# Patient Record
Sex: MALE | Race: WHITE | NOT HISPANIC OR LATINO | Employment: FULL TIME | URBAN - METROPOLITAN AREA
[De-identification: names, ages, dates, MRNs, and addresses within clinical notes are randomized per-mention and may not be internally consistent; named-entity substitution may affect disease eponyms.]

---

## 2018-08-24 ENCOUNTER — HOSPITAL ENCOUNTER (INPATIENT)
Facility: HOSPITAL | Age: 58
LOS: 2 days | Discharge: HOME | DRG: 390 | End: 2018-08-26
Attending: EMERGENCY MEDICINE | Admitting: SURGERY
Payer: COMMERCIAL

## 2018-08-24 ENCOUNTER — APPOINTMENT (EMERGENCY)
Dept: CT IMAGING | Facility: HOSPITAL | Age: 58
DRG: 390 | End: 2018-08-24
Payer: COMMERCIAL

## 2018-08-24 DIAGNOSIS — K56.609 SMALL BOWEL OBSTRUCTION (HCC): Primary | ICD-10-CM

## 2018-08-24 LAB
ALBUMIN SERPL BCP-MCNC: 2.7 G/DL (ref 3.5–5)
ALP SERPL-CCNC: 98 U/L (ref 46–116)
ALT SERPL W P-5'-P-CCNC: 40 U/L (ref 12–78)
ANION GAP SERPL CALCULATED.3IONS-SCNC: 7 MMOL/L (ref 4–13)
AST SERPL W P-5'-P-CCNC: 22 U/L (ref 5–45)
BASOPHILS # BLD AUTO: 0.02 THOUSANDS/ΜL (ref 0–0.1)
BASOPHILS NFR BLD AUTO: 0 % (ref 0–1)
BILIRUB SERPL-MCNC: 0.7 MG/DL (ref 0.2–1)
BUN SERPL-MCNC: 13 MG/DL (ref 5–25)
CALCIUM SERPL-MCNC: 10 MG/DL (ref 8.3–10.1)
CHLORIDE SERPL-SCNC: 100 MMOL/L (ref 100–108)
CO2 SERPL-SCNC: 28 MMOL/L (ref 21–32)
CREAT SERPL-MCNC: 1.03 MG/DL (ref 0.6–1.3)
EOSINOPHIL # BLD AUTO: 0.04 THOUSAND/ΜL (ref 0–0.61)
EOSINOPHIL NFR BLD AUTO: 1 % (ref 0–6)
ERYTHROCYTE [DISTWIDTH] IN BLOOD BY AUTOMATED COUNT: 16 % (ref 11.6–15.1)
GFR SERPL CREATININE-BSD FRML MDRD: 81 ML/MIN/1.73SQ M
GLUCOSE SERPL-MCNC: 120 MG/DL (ref 65–140)
HCT VFR BLD AUTO: 48.4 % (ref 36.5–49.3)
HGB BLD-MCNC: 15.7 G/DL (ref 12–17)
IMM GRANULOCYTES # BLD AUTO: 0.02 THOUSAND/UL (ref 0–0.2)
IMM GRANULOCYTES NFR BLD AUTO: 0 % (ref 0–2)
LIPASE SERPL-CCNC: 190 U/L (ref 73–393)
LYMPHOCYTES # BLD AUTO: 1.07 THOUSANDS/ΜL (ref 0.6–4.47)
LYMPHOCYTES NFR BLD AUTO: 13 % (ref 14–44)
MCH RBC QN AUTO: 26.3 PG (ref 26.8–34.3)
MCHC RBC AUTO-ENTMCNC: 32.4 G/DL (ref 31.4–37.4)
MCV RBC AUTO: 81 FL (ref 82–98)
MONOCYTES # BLD AUTO: 0.35 THOUSAND/ΜL (ref 0.17–1.22)
MONOCYTES NFR BLD AUTO: 4 % (ref 4–12)
NEUTROPHILS # BLD AUTO: 6.56 THOUSANDS/ΜL (ref 1.85–7.62)
NEUTS SEG NFR BLD AUTO: 82 % (ref 43–75)
NRBC BLD AUTO-RTO: 0 /100 WBCS
PLATELET # BLD AUTO: 316 THOUSANDS/UL (ref 149–390)
PMV BLD AUTO: 9.6 FL (ref 8.9–12.7)
POTASSIUM SERPL-SCNC: 4.2 MMOL/L (ref 3.5–5.3)
PROT SERPL-MCNC: 8.6 G/DL (ref 6.4–8.2)
RBC # BLD AUTO: 5.98 MILLION/UL (ref 3.88–5.62)
SODIUM SERPL-SCNC: 135 MMOL/L (ref 136–145)
TROPONIN I SERPL-MCNC: <0.02 NG/ML
WBC # BLD AUTO: 8.06 THOUSAND/UL (ref 4.31–10.16)

## 2018-08-24 PROCEDURE — 99285 EMERGENCY DEPT VISIT HI MDM: CPT

## 2018-08-24 PROCEDURE — 96375 TX/PRO/DX INJ NEW DRUG ADDON: CPT

## 2018-08-24 PROCEDURE — 84484 ASSAY OF TROPONIN QUANT: CPT | Performed by: EMERGENCY MEDICINE

## 2018-08-24 PROCEDURE — 93005 ELECTROCARDIOGRAM TRACING: CPT

## 2018-08-24 PROCEDURE — 96361 HYDRATE IV INFUSION ADD-ON: CPT

## 2018-08-24 PROCEDURE — 80053 COMPREHEN METABOLIC PANEL: CPT | Performed by: EMERGENCY MEDICINE

## 2018-08-24 PROCEDURE — 74177 CT ABD & PELVIS W/CONTRAST: CPT

## 2018-08-24 PROCEDURE — 36415 COLL VENOUS BLD VENIPUNCTURE: CPT | Performed by: EMERGENCY MEDICINE

## 2018-08-24 PROCEDURE — 85025 COMPLETE CBC W/AUTO DIFF WBC: CPT | Performed by: EMERGENCY MEDICINE

## 2018-08-24 PROCEDURE — 96374 THER/PROPH/DIAG INJ IV PUSH: CPT

## 2018-08-24 PROCEDURE — 83690 ASSAY OF LIPASE: CPT | Performed by: EMERGENCY MEDICINE

## 2018-08-24 PROCEDURE — 96376 TX/PRO/DX INJ SAME DRUG ADON: CPT

## 2018-08-24 RX ORDER — ONDANSETRON 2 MG/ML
4 INJECTION INTRAMUSCULAR; INTRAVENOUS ONCE
Status: COMPLETED | OUTPATIENT
Start: 2018-08-24 | End: 2018-08-24

## 2018-08-24 RX ORDER — PANTOPRAZOLE SODIUM 40 MG/1
40 INJECTION, POWDER, FOR SOLUTION INTRAVENOUS
Status: DISCONTINUED | OUTPATIENT
Start: 2018-08-25 | End: 2018-08-26 | Stop reason: HOSPADM

## 2018-08-24 RX ORDER — SODIUM CHLORIDE, SODIUM LACTATE, POTASSIUM CHLORIDE, CALCIUM CHLORIDE 600; 310; 30; 20 MG/100ML; MG/100ML; MG/100ML; MG/100ML
125 INJECTION, SOLUTION INTRAVENOUS CONTINUOUS
Status: DISCONTINUED | OUTPATIENT
Start: 2018-08-24 | End: 2018-08-26 | Stop reason: HOSPADM

## 2018-08-24 RX ORDER — PANTOPRAZOLE SODIUM 20 MG/1
20 TABLET, DELAYED RELEASE ORAL DAILY
COMMUNITY

## 2018-08-24 RX ORDER — LORATADINE 10 MG/1
10 TABLET ORAL DAILY
COMMUNITY

## 2018-08-24 RX ORDER — MIDAZOLAM HYDROCHLORIDE 1 MG/ML
1 INJECTION INTRAMUSCULAR; INTRAVENOUS ONCE
Status: COMPLETED | OUTPATIENT
Start: 2018-08-24 | End: 2018-08-24

## 2018-08-24 RX ADMIN — HYDROMORPHONE HYDROCHLORIDE 1 MG: 1 INJECTION, SOLUTION INTRAMUSCULAR; INTRAVENOUS; SUBCUTANEOUS at 23:06

## 2018-08-24 RX ADMIN — SODIUM CHLORIDE, SODIUM LACTATE, POTASSIUM CHLORIDE, AND CALCIUM CHLORIDE 125 ML/HR: .6; .31; .03; .02 INJECTION, SOLUTION INTRAVENOUS at 22:37

## 2018-08-24 RX ADMIN — SODIUM CHLORIDE 1000 ML: 0.9 INJECTION, SOLUTION INTRAVENOUS at 19:09

## 2018-08-24 RX ADMIN — TOPICAL ANESTHETIC 2 SPRAY: 200 SPRAY DENTAL; PERIODONTAL at 21:23

## 2018-08-24 RX ADMIN — ONDANSETRON 4 MG: 2 INJECTION INTRAMUSCULAR; INTRAVENOUS at 19:08

## 2018-08-24 RX ADMIN — MIDAZOLAM HYDROCHLORIDE 1 MG: 1 INJECTION, SOLUTION INTRAMUSCULAR; INTRAVENOUS at 21:23

## 2018-08-24 RX ADMIN — IOHEXOL 100 ML: 350 INJECTION, SOLUTION INTRAVENOUS at 19:59

## 2018-08-24 RX ADMIN — HYDROMORPHONE HYDROCHLORIDE 1 MG: 1 INJECTION, SOLUTION INTRAMUSCULAR; INTRAVENOUS; SUBCUTANEOUS at 19:08

## 2018-08-24 RX ADMIN — HYDROMORPHONE HYDROCHLORIDE 1 MG: 1 INJECTION, SOLUTION INTRAMUSCULAR; INTRAVENOUS; SUBCUTANEOUS at 19:59

## 2018-08-24 NOTE — ED PROVIDER NOTES
History  Chief Complaint   Patient presents with    Abdominal Pain     Pt  complains of mid upper abdominal pain since this am, also complains of irregular bowel movements and feeling of abdominal fullness  Pt  currently vomiting  History provided by:  Patient   used: No     53-year-old male presented with epigastric pain, abdominal bloating, fullness some nausea and 1 episode of vomiting developing earlier today  No history of similar symptoms  Minimal alcohol use  History of cholecystectomy  No other abdominal surgeries  No history of pancreatitis  No urinary complaints  No fever, chills, diarrhea  He had felt bloated and had given himself an enema which was not successful  Last normal bowel movement was yesterday  On exam, hypertensive  Some mild diffuse abdominal tenderness  Seems uncomfortable overall  Plan labs, EKG, abdominal CT  Differential diagnosis includes pancreatitis, bowel obstruction, viral illness, ACS  Prior to Admission Medications   Prescriptions Last Dose Informant Patient Reported? Taking? Testosterone (ANDROGEL TD)   Yes Yes   Sig: Place on the skin   loratadine (CLARITIN) 10 mg tablet   Yes Yes   Sig: Take 10 mg by mouth daily   pantoprazole (PROTONIX) 20 mg tablet   Yes Yes   Sig: Take 20 mg by mouth daily      Facility-Administered Medications: None       History reviewed  No pertinent past medical history  Past Surgical History:   Procedure Laterality Date    CHOLECYSTECTOMY      MENISCECTOMY Right        History reviewed  No pertinent family history  I have reviewed and agree with the history as documented  Social History   Substance Use Topics    Smoking status: Never Smoker    Smokeless tobacco: Never Used    Alcohol use No        Review of Systems   Constitutional: Negative for activity change, appetite change and fever  Respiratory: Negative for chest tightness and shortness of breath      Cardiovascular: Negative for chest pain and leg swelling  Gastrointestinal: Positive for abdominal distention, abdominal pain, nausea and vomiting  Negative for blood in stool and diarrhea  Musculoskeletal: Negative for back pain, gait problem and neck pain  Skin: Negative for pallor  All other systems reviewed and are negative  Physical Exam  Physical Exam   Constitutional: He is oriented to person, place, and time  He appears well-developed and well-nourished  No distress  HENT:   Head: Normocephalic  Mouth/Throat: Oropharynx is clear and moist    Neck: Normal range of motion  Neck supple  Cardiovascular: Normal rate, regular rhythm, normal heart sounds and intact distal pulses  Pulmonary/Chest: Effort normal and breath sounds normal    Abdominal: Soft  There is no rebound and no guarding  Some epigastric and mild diffuse tenderness  Musculoskeletal: Normal range of motion  He exhibits no edema  Neurological: He is alert and oriented to person, place, and time  Skin: Skin is warm and dry  Nursing note and vitals reviewed        Vital Signs  ED Triage Vitals   Temperature Pulse Respirations Blood Pressure SpO2   08/24/18 1846 08/24/18 1843 08/24/18 1843 08/24/18 1843 08/24/18 1843   97 9 °F (36 6 °C) 100 18 (!) 180/105 98 %      Temp Source Heart Rate Source Patient Position - Orthostatic VS BP Location FiO2 (%)   08/24/18 1846 08/24/18 1843 08/24/18 1843 08/24/18 1843 --   Oral Monitor Sitting Right arm       Pain Score       08/24/18 1846       Worst Possible Pain           Vitals:    08/24/18 1843 08/24/18 1930 08/24/18 2030   BP: (!) 180/105 150/99 137/90   Pulse: 100 92    Patient Position - Orthostatic VS: Sitting Sitting Sitting       Visual Acuity      ED Medications  Medications   benzocaine (HURRICAINE) 20 % mucosal spray 2 spray (not administered)   midazolam (VERSED) injection 1 mg (not administered)   sodium chloride 0 9 % bolus 1,000 mL (1,000 mL Intravenous New Bag 8/24/18 1909)   HYDROmorphone (DILAUDID) injection 1 mg (1 mg Intravenous Given 8/24/18 1908)   ondansetron (ZOFRAN) injection 4 mg (4 mg Intravenous Given 8/24/18 1908)   HYDROmorphone (DILAUDID) injection 1 mg (1 mg Intravenous Given 8/24/18 1959)   iohexol (OMNIPAQUE) 350 MG/ML injection (MULTI-DOSE) 100 mL (100 mL Intravenous Given 8/24/18 1959)       Diagnostic Studies  Results Reviewed     Procedure Component Value Units Date/Time    Troponin I [[id number]]  (Normal) Collected:  08/24/18 1907    Lab Status:  Final result Specimen:  Blood from Arm, Right Updated:  08/24/18 1942     Troponin I <0 02 ng/mL     Comprehensive metabolic panel [[id number]]  (Abnormal) Collected:  08/24/18 1907    Lab Status:  Final result Specimen:  Blood from Arm, Right Updated:  08/24/18 1939     Sodium 135 (L) mmol/L      Potassium 4 2 mmol/L      Chloride 100 mmol/L      CO2 28 mmol/L      Anion Gap 7 mmol/L      BUN 13 mg/dL      Creatinine 1 03 mg/dL      Glucose 120 mg/dL      Calcium 10 0 mg/dL      AST 22 U/L      ALT 40 U/L      Alkaline Phosphatase 98 U/L      Total Protein 8 6 (H) g/dL      Albumin 2 7 (L) g/dL      Total Bilirubin 0 70 mg/dL      eGFR 81 ml/min/1 73sq m     Narrative:         National Kidney Disease Education Program recommendations are as follows:  GFR calculation is accurate only with a steady state creatinine  Chronic Kidney disease less than 60 ml/min/1 73 sq  meters  Kidney failure less than 15 ml/min/1 73 sq  meters      Lipase [[id number]]  (Normal) Collected:  08/24/18 1907    Lab Status:  Final result Specimen:  Blood from Arm, Right Updated:  08/24/18 1939     Lipase 190 u/L     CBC and differential [[id number]]  (Abnormal) Collected:  08/24/18 1907    Lab Status:  Final result Specimen:  Blood from Arm, Right Updated:  08/24/18 1920     WBC 8 06 Thousand/uL      RBC 5 98 (H) Million/uL      Hemoglobin 15 7 g/dL      Hematocrit 48 4 %      MCV 81 (L) fL      MCH 26 3 (L) pg      MCHC 32 4 g/dL      RDW 16 0 (H) %      MPV 9 6 fL Platelets 346 Thousands/uL      nRBC 0 /100 WBCs      Neutrophils Relative 82 (H) %      Immat GRANS % 0 %      Lymphocytes Relative 13 (L) %      Monocytes Relative 4 %      Eosinophils Relative 1 %      Basophils Relative 0 %      Neutrophils Absolute 6 56 Thousands/µL      Immature Grans Absolute 0 02 Thousand/uL      Lymphocytes Absolute 1 07 Thousands/µL      Monocytes Absolute 0 35 Thousand/µL      Eosinophils Absolute 0 04 Thousand/µL      Basophils Absolute 0 02 Thousands/µL                  CT abdomen pelvis with contrast   Final Result by Victor M Sin MD (08/24 2100)   1  Distal small bowel obstruction  Transition point in the mid to distal ileum in the lateral aspect of the right mid abdomen  No evidence of bowel mass, enteritis or colitis  2   Right middle lobe 5 mm pulmonary nodule Based on current Fleischner Society 2017 Guidelines on incidental pulmonary nodule, no routine follow-up is needed if the patient is considered low risk for lung cancer  If the patient is considered high risk    for lung cancer, 12 month follow-up non-contrast chest CT is recommended               I personally discussed this study with Anjelica William on 8/24/2018 at 9:00 PM                   Workstation performed: [id number]                    Procedures  ECG 12 Lead Documentation  Date/Time: 8/24/2018 7:28 PM  Performed by: Janett Aparicio  Authorized by: Janett Aparicio     Indications / Diagnosis:  ACS  ECG reviewed by me, the ED Provider: yes    Patient location:  ED  Previous ECG:     Previous ECG:  Compared to current    Comparison ECG info:  2/19/00    Similarity:  No change  Rate:     ECG rate:  87  Rhythm:     Rhythm: sinus rhythm    Ectopy:     Ectopy: none    QRS:     QRS axis:  Normal  Conduction:     Conduction: abnormal      Abnormal conduction: 1st degree    ST segments:     ST segments:  Normal  T waves:     T waves: normal             Phone Contacts  ED Phone Contact    ED Course MDM  Number of Diagnoses or Management Options  Small bowel obstruction [facility]):   Diagnosis management comments: 82-year-old male presented with 1 day of epigastric pain, abdominal bloating and vomiting  Found to have a small-bowel obstruction on CT with a transition point in the ileum  No history of similar  Has a history of cholecystectomy  Plan NG tube insertion, surgical team admission  Amount and/or Complexity of Data Reviewed  Clinical lab tests: ordered and reviewed  Tests in the radiology section of CPT®: ordered and reviewed  Discuss the patient with other providers: yes    Patient Progress  Patient progress: improved    CritCare Time    Disposition  Final diagnoses:   Small bowel obstruction ([address] )     Time reflects when diagnosis was documented in both MDM as applicable and the Disposition within this note     Time User Action Codes Description Comment    8/24/2018  8:55 PM Bearpaulette Lee Add [K56 609] Small bowel obstruction [facility])       ED Disposition     ED Disposition Condition Comment    Admit  Case was discussed with Joy Trejo and the patient's admission status was agreed to be Admission Status: inpatient status to the service of Dr Ara Chester   Follow-up Information    None         Patient's Medications   Discharge Prescriptions    No medications on file     No discharge procedures on file      ED Provider  Electronically Signed by           Tyron Flores MD  08/24/18 5575

## 2018-08-25 ENCOUNTER — APPOINTMENT (INPATIENT)
Dept: RADIOLOGY | Facility: HOSPITAL | Age: 58
DRG: 390 | End: 2018-08-25
Payer: COMMERCIAL

## 2018-08-25 LAB
ANION GAP SERPL CALCULATED.3IONS-SCNC: 5 MMOL/L (ref 4–13)
BUN SERPL-MCNC: 12 MG/DL (ref 5–25)
CALCIUM SERPL-MCNC: 8.6 MG/DL (ref 8.3–10.1)
CHLORIDE SERPL-SCNC: 103 MMOL/L (ref 100–108)
CO2 SERPL-SCNC: 30 MMOL/L (ref 21–32)
CREAT SERPL-MCNC: 0.9 MG/DL (ref 0.6–1.3)
ERYTHROCYTE [DISTWIDTH] IN BLOOD BY AUTOMATED COUNT: 15.9 % (ref 11.6–15.1)
GFR SERPL CREATININE-BSD FRML MDRD: 95 ML/MIN/1.73SQ M
GLUCOSE SERPL-MCNC: 115 MG/DL (ref 65–140)
HCT VFR BLD AUTO: 41.6 % (ref 36.5–49.3)
HGB BLD-MCNC: 13.6 G/DL (ref 12–17)
MCH RBC QN AUTO: 26.5 PG (ref 26.8–34.3)
MCHC RBC AUTO-ENTMCNC: 32.7 G/DL (ref 31.4–37.4)
MCV RBC AUTO: 81 FL (ref 82–98)
PLATELET # BLD AUTO: 275 THOUSANDS/UL (ref 149–390)
PMV BLD AUTO: 9.9 FL (ref 8.9–12.7)
POTASSIUM SERPL-SCNC: 4.1 MMOL/L (ref 3.5–5.3)
RBC # BLD AUTO: 5.13 MILLION/UL (ref 3.88–5.62)
SODIUM SERPL-SCNC: 138 MMOL/L (ref 136–145)
WBC # BLD AUTO: 10.11 THOUSAND/UL (ref 4.31–10.16)

## 2018-08-25 PROCEDURE — 85027 COMPLETE CBC AUTOMATED: CPT | Performed by: PHYSICIAN ASSISTANT

## 2018-08-25 PROCEDURE — 80048 BASIC METABOLIC PNL TOTAL CA: CPT | Performed by: PHYSICIAN ASSISTANT

## 2018-08-25 PROCEDURE — 74022 RADEX COMPL AQT ABD SERIES: CPT

## 2018-08-25 PROCEDURE — C9113 INJ PANTOPRAZOLE SODIUM, VIA: HCPCS | Performed by: PHYSICIAN ASSISTANT

## 2018-08-25 RX ORDER — DIPHENHYDRAMINE HYDROCHLORIDE 50 MG/ML
25 INJECTION INTRAMUSCULAR; INTRAVENOUS
Status: DISCONTINUED | OUTPATIENT
Start: 2018-08-25 | End: 2018-08-26 | Stop reason: HOSPADM

## 2018-08-25 RX ORDER — KETOROLAC TROMETHAMINE 30 MG/ML
15 INJECTION, SOLUTION INTRAMUSCULAR; INTRAVENOUS EVERY 6 HOURS PRN
Status: DISCONTINUED | OUTPATIENT
Start: 2018-08-25 | End: 2018-08-26 | Stop reason: HOSPADM

## 2018-08-25 RX ORDER — LANOLIN ALCOHOL/MO/W.PET/CERES
6 CREAM (GRAM) TOPICAL
Status: DISCONTINUED | OUTPATIENT
Start: 2018-08-25 | End: 2018-08-26 | Stop reason: HOSPADM

## 2018-08-25 RX ADMIN — SODIUM CHLORIDE, SODIUM LACTATE, POTASSIUM CHLORIDE, AND CALCIUM CHLORIDE 125 ML/HR: .6; .31; .03; .02 INJECTION, SOLUTION INTRAVENOUS at 07:43

## 2018-08-25 RX ADMIN — HYDROMORPHONE HYDROCHLORIDE 1 MG: 1 INJECTION, SOLUTION INTRAMUSCULAR; INTRAVENOUS; SUBCUTANEOUS at 04:10

## 2018-08-25 RX ADMIN — ENOXAPARIN SODIUM 40 MG: 40 INJECTION SUBCUTANEOUS at 08:48

## 2018-08-25 RX ADMIN — SODIUM CHLORIDE, SODIUM LACTATE, POTASSIUM CHLORIDE, AND CALCIUM CHLORIDE 125 ML/HR: .6; .31; .03; .02 INJECTION, SOLUTION INTRAVENOUS at 15:45

## 2018-08-25 RX ADMIN — PANTOPRAZOLE SODIUM 40 MG: 40 INJECTION, POWDER, FOR SOLUTION INTRAVENOUS at 08:48

## 2018-08-25 RX ADMIN — Medication 1 SPRAY: at 10:35

## 2018-08-25 NOTE — PLAN OF CARE
DISCHARGE PLANNING     Discharge to home or other facility with appropriate resources Progressing        GASTROINTESTINAL - ADULT     Minimal or absence of nausea and/or vomiting Progressing        INFECTION - ADULT     Absence or prevention of progression during hospitalization Progressing        Knowledge Deficit     Patient/family/caregiver demonstrates understanding of disease process, treatment plan, medications, and discharge instructions Progressing        METABOLIC, FLUID AND ELECTROLYTES - ADULT     Electrolytes maintained within normal limits Progressing     Fluid balance maintained Progressing        PAIN - ADULT     Verbalizes/displays adequate comfort level or baseline comfort level Progressing        SAFETY ADULT     Patient will remain free of falls Progressing

## 2018-08-25 NOTE — PLAN OF CARE
Discharge to home or other facility with appropriate resources Progressing      Minimal or absence of nausea and/or vomiting Progressing      Absence or prevention of progression during hospitalization Progressing      Patient/family/caregiver demonstrates understanding of disease process, treatment plan, medications, and discharge instructions Progressing      Electrolytes maintained within normal limits Progressing      Fluid balance maintained Progressing      Verbalizes/displays adequate comfort level or baseline comfort level Progressing      Patient will remain free of falls Progressing

## 2018-08-25 NOTE — H&P
History and Physical -General Surgery  Mayte Vegas 66 y o  male MRN: [id number]  Unit/Bed#: ED 29 Encounter: [id number]        Reason for Consult / Principal Problem: epigastric pain    HPI: Mayte Vegas is a 66y o  year old male with history of laparoscopic cholecystectomy who presents with 2 day history of epigastric discomfort, bloating and poor appetite for the past 2 days  He thought he needed fiber so he took an over the counter medication which gave him no relief from his symptoms  His last BM was yesterday but he noted to have smaller volume for the past 3 days  He was not able to sleep last night because of abdominal pain and came to the ED today because it was not getting better and he became nauseated and vomited  Cat scan shows distal small bowel obstruction  Transition point in the mid to distal ileum in the lateral aspect of the right mid abdomen  No evidence of bowel mass, enteritis or colitis  Right middle lobe 5 mm pulmonary nodule    Review of Systems   Constitutional: Positive for activity change, appetite change, diaphoresis and fever  Gastrointestinal: Positive for abdominal distention, abdominal pain, nausea and vomiting  Negative for constipation and diarrhea  Skin: Negative for color change         Historical Information   Past Medical History:   Diagnosis Date    Arthritis     R knee    GERD (gastroesophageal reflux disease)      Past Surgical History:   Procedure Laterality Date    CHOLECYSTECTOMY, lap  1996    MENISCECTOMY Right      Social History   History   Alcohol Use No     History   Drug Use No     History   Smoking Status    Never Smoker   Smokeless Tobacco    Never Used     Family History   Problem Relation Age of Onset    Arthritis Mother     Osteoporosis Mother     Heart disease Father     No Known Problems Sister        Meds/Allergies     Home medications:   Claritin  Androgel  Protonix PRN  Uflexa injection right knee 2x/year- Dr Nilda Hernandez    No Known Allergies    Objective     Blood pressure 137/90, pulse 92, temperature 97 9 °F (36 6 °C), temperature source Oral, resp  rate 16, weight 82 kg (180 lb 12 4 oz), SpO2 94 %      Intake/Output Summary (Last 24 hours) at 08/24/18 2159  Last data filed at 08/24/18 2121   Gross per 24 hour   Intake             1000 ml   Output                0 ml   Net             1000 ml       PHYSICAL EXAM  General appearance: alert and oriented, in no acute distress, NGT in place  Skin: Skin color, texture, turgor normal  No rashes or lesions  Head: Normocephalic, without obvious abnormality  Heart: regular rate and rhythm, S1, S2 normal, no murmur, click, rub or gallop  Lungs: clear to auscultation bilaterally  Abdomen: round and distended, (-)BS diffusely tender without guarding or rebound  Neurological: normal without focal findings    Lab Results:   Admission on 08/24/2018   Component Date Value    WBC 08/24/2018 8 06     RBC 08/24/2018 5 98*    Hemoglobin 08/24/2018 15 7     Hematocrit 08/24/2018 48 4     MCV 08/24/2018 81*    MCH 08/24/2018 26 3*    MCHC 08/24/2018 32 4     RDW 08/24/2018 16 0*    MPV 08/24/2018 9 6     Platelets 82/96/5156 316     nRBC 08/24/2018 0     Neutrophils Relative 08/24/2018 82*    Immat GRANS % 08/24/2018 0     Lymphocytes Relative 08/24/2018 13*    Monocytes Relative 08/24/2018 4     Eosinophils Relative 08/24/2018 1     Basophils Relative 08/24/2018 0     Neutrophils Absolute 08/24/2018 6 56     Immature Grans Absolute 08/24/2018 0 02     Lymphocytes Absolute 08/24/2018 1 07     Monocytes Absolute 08/24/2018 0 35     Eosinophils Absolute 08/24/2018 0 04     Basophils Absolute 08/24/2018 0 02     Sodium 08/24/2018 135*    Potassium 08/24/2018 4 2     Chloride 08/24/2018 100     CO2 08/24/2018 28     Anion Gap 08/24/2018 7     BUN 08/24/2018 13     Creatinine 08/24/2018 1 03     Glucose 08/24/2018 120     Calcium 08/24/2018 10 0     AST 08/24/2018 22     ALT 08/24/2018 40     Alkaline Phosphatase 08/24/2018 98     Total Protein 08/24/2018 8 6*    Albumin 08/24/2018 2 7*    Total Bilirubin 08/24/2018 0 70     eGFR 08/24/2018 81     Lipase 08/24/2018 190     Troponin I 08/24/2018 <0 02      Imaging Studies: I have personally reviewed pertinent reports  ASSESSMENT/PLAN: 50 yo with SBO most likely secondary to adhesions from previous surgery  Right pulmonary nodule - incidental finding  · NPO  · NGT  · IVF  · IV Protonix  · I/O     This patient will require  >2 night hospital stay  Counseling / Coordination of Care  Total time spent today  30 minutes  Greater than 50% of total time was spent with the patient and / or family counseling and / or coordination of care

## 2018-08-25 NOTE — CASE MANAGEMENT
Initial Clinical Review    Admission: Date/Time/Statement: 8/24/18 @ 2102     Orders Placed This Encounter   Procedures    Inpatient Admission (expected length of stay for this patient is greater than two midnights)     Standing Status:   Standing     Number of Occurrences:   1     Order Specific Question:   Admitting Physician     Answer:   Jonah Briggs [[id number]]     Order Specific Question:   Level of Care     Answer:   Med Surg [[id number]]     Order Specific Question:   Estimated length of stay     Answer:   More than 2 Midnights     Order Specific Question:   Certification     Answer:   I certify that inpatient services are medically necessary for this patient for a duration of greater than two midnights  See H&P and MD Progress Notes for additional information about the patient's course of treatment  ED: Date/Time/Mode of Arrival:   ED Arrival Information     Expected Arrival Acuity Means of Arrival Escorted By Service Admission Type    - 8/24/2018 18:33 Urgent Walk-In Self Surgery-General Urgent    Arrival Complaint    abd pain          Chief Complaint:   Chief Complaint   Patient presents with    Abdominal Pain     Pt  complains of mid upper abdominal pain since this am, also complains of irregular bowel movements and feeling of abdominal fullness  Pt  currently vomiting  History of Illness: Mario Seymour is a 66y o  year old male with history of laparoscopic cholecystectomy who presents with 2 day history of epigastric discomfort, bloating and poor appetite for the past 2 days  He thought he needed fiber so he took an over the counter medication which gave him no relief from his symptoms  His last BM was yesterday but he noted to have smaller volume for the past 3 days  He was not able to sleep last night because of abdominal pain and came to the ED today because it was not getting better and he became nauseated and vomited      ED Vital Signs:   ED Triage Vitals   Temperature Pulse Respirations Blood Pressure SpO2   08/24/18 1846 08/24/18 1843 08/24/18 1843 08/24/18 1843 08/24/18 1843   97 9 °F (36 6 °C) 100 18 (!) 180/105 98 %      Temp Source Heart Rate Source Patient Position - Orthostatic VS BP Location FiO2 (%)   08/24/18 1846 08/24/18 1843 08/24/18 1843 08/24/18 1843 --   Oral Monitor Sitting Right arm       Pain Score       08/24/18 1846       Worst Possible Pain        Wt Readings from Last 1 Encounters:   08/24/18 82 kg (180 lb 12 4 oz)       Vital Signs (abnormal):    /95 150/99    Abnormal Labs/Diagnostic Test Results:       Ct Abdomen/pelvis  1   Distal small bowel obstruction   Transition point in the mid to distal ileum in the lateral aspect of the right mid abdomen   No evidence of bowel mass, enteritis or colitis  2   Right middle lobe 5 mm pulmonary nodule     ED Treatment:   Medication Administration from 08/24/2018 1833 to 08/24/2018 2217       Date/Time Order Dose Route Action Comments     08/24/2018 2121 sodium chloride 0 9 % bolus 1,000 mL 0 mL Intravenous Stopped      08/24/2018 1909 sodium chloride 0 9 % bolus 1,000 mL 1,000 mL Intravenous New Bag      08/24/2018 1908 HYDROmorphone (DILAUDID) injection 1 mg 1 mg Intravenous Given      08/24/2018 1908 ondansetron (ZOFRAN) injection 4 mg 4 mg Intravenous Given      08/24/2018 1959 HYDROmorphone (DILAUDID) injection 1 mg 1 mg Intravenous Given      08/24/2018 1959 iohexol (OMNIPAQUE) 350 MG/ML injection (MULTI-DOSE) 100 mL 100 mL Intravenous Given      08/24/2018 2123 benzocaine (HURRICAINE) 20 % mucosal spray 2 spray 2 spray Mucosal Given      08/24/2018 2123 midazolam (VERSED) injection 1 mg 1 mg Intravenous Given           Past Medical/Surgical History:    Active Ambulatory Problems     Diagnosis Date Noted    No Active Ambulatory Problems     Resolved Ambulatory Problems     Diagnosis Date Noted    No Resolved Ambulatory Problems     Past Medical History:   Diagnosis Date    Arthritis     GERD (gastroesophageal reflux disease)        Admitting Diagnosis: Small bowel obstruction (HCC) [K56 609]  Abdominal pain [R10 9]    Age/Sex: 66 y o  male    Assessment/Plan:     48 yo with SBO most likely secondary to adhesions from previous surgery  Right pulmonary nodule - incidental finding  · NPO  · NGT  · IVF  · IV Protonix  · I/O      This patient will require  >2 night hospital stay      Admission Orders:  Scheduled Meds:   Current Facility-Administered Medications:  diphenhydrAMINE 25 mg Intravenous HS PRN Jerris Fothergill, PA-C    enoxaparin 40 mg Subcutaneous Daily Aby Sanchez PA-C    HYDROmorphone 1 mg Intravenous Q3H PRN Jerris Fothergill, PA-C    ketorolac 15 mg Intravenous Q6H PRN Jerris Fothergill, PA-C    lactated ringers 125 mL/hr Intravenous Continuous Aby Sanchez PA-C Last Rate: 125 mL/hr (08/25/18 1545)   melatonin 6 mg Oral HS Luisa Keller PA-C    pantoprazole 40 mg Intravenous Q24H [address] Aby Sanchez PA-C    phenol 1 spray Mouth/Throat Q2H PRN Aby Sanchez PA-C      Continuous Infusions:   lactated ringers 125 mL/hr Last Rate: 125 mL/hr (08/25/18 1545)     PRN Meds:   diphenhydrAMINE    HYDROmorphone IV x 2     ketorolac    Phenol x 1 sore throat spray    NPO  NGT   Up as tolerated  CBC, BMP 8/26  SCD's

## 2018-08-25 NOTE — PROGRESS NOTES
Progress Note -Surgery PA  Pam Taveras 66 y o  male MRN: [id number]  Unit/Bed#: -01 Encounter: [id number]      Subjective/Objective     Subjective:  Patient stating his abdominal pain is better than last night  He denies passing gas or having a bowel movement  NG tube 320 cc output since insertion  Did vomit after receiving pain medication last night  Objective:     /91 (BP Location: Left arm)   Pulse 73   Temp (!) 97 4 °F (36 3 °C) (Oral)   Resp 18   Wt 82 kg (180 lb 12 4 oz)   SpO2 97%   BMI 26 70 kg/m²       Intake/Output Summary (Last 24 hours) at 08/25/18 7764  Last data filed at 08/25/18 0508   Gross per 24 hour   Intake             1980 ml   Output              320 ml   Net             1660 ml       Invasive Devices     Peripheral Intravenous Line            Peripheral IV 08/24/18 Right Antecubital less than 1 day          Drain            NG/OG/Enteral Tube Nasogastric 16 Fr Right nares less than 1 day                Physical Exam:  General appearance: alert and oriented, in no acute distress  Lungs: clear to auscultation bilaterally  Heart: regular rate and rhythm, S1, S2 normal, no murmur, click, rub or gallop  Abdomen: Soft, bowel sounds positive, nontender to palpation        Current Facility-Administered Medications:     diphenhydrAMINE (BENADRYL) injection 25 mg, 25 mg, Intravenous, HS PRN, Luisa Keller PA-C    enoxaparin (LOVENOX) subcutaneous injection 40 mg, 40 mg, Subcutaneous, Daily, Cecilia Alfredo PA-C    HYDROmorphone (DILAUDID) injection 1 mg, 1 mg, Intravenous, Q3H PRN, Luisa Keller PA-C    ketorolac (TORADOL) injection 15 mg, 15 mg, Intravenous, Q6H PRN, Luisa Keller PA-C    lactated ringers infusion, 125 mL/hr, Intravenous, Continuous, Cecilia Alfredo PA-C, Last Rate: 125 mL/hr at 08/25/18 0743, 125 mL/hr at 08/25/18 0743    melatonin tablet 6 mg, 6 mg, Oral, HS, Luisa Keller PA-C    pantoprazole (PROTONIX) injection 40 mg, 40 mg, Intravenous, Q24H [address], Marita Minor PA-C    phenol (CHLORASEPTIC) 1 4 % mucosal liquid 1 spray, 1 spray, Mouth/Throat, Q2H PRN, Marita Minor PA-C              Lab, Imaging and other studies:  I have personally reviewed pertinent lab results  , CBC:   Lab Results   Component Value Date    WBC 10 11 08/25/2018    HGB 13 6 08/25/2018    HCT 41 6 08/25/2018    MCV 81 (L) 08/25/2018     08/25/2018    MCH 26 5 (L) 08/25/2018    MCHC 32 7 08/25/2018    RDW 15 9 (H) 08/25/2018    MPV 9 9 08/25/2018    NRBC 0 08/24/2018   , CMP:   Lab Results   Component Value Date     08/25/2018    K 4 1 08/25/2018     08/25/2018    CO2 30 08/25/2018    ANIONGAP 5 08/25/2018    BUN 12 08/25/2018    CREATININE 0 90 08/25/2018    GLUCOSE 115 08/25/2018    CALCIUM 8 6 08/25/2018    AST 22 08/24/2018    ALT 40 08/24/2018    ALKPHOS 98 08/24/2018    PROT 8 6 (H) 08/24/2018    BILITOT 0 70 08/24/2018    EGFR 95 08/25/2018     Labs in chart were reviewed  Lab Results   Component Value Date    WBC 10 11 08/25/2018    HGB 13 6 08/25/2018    HCT 41 6 08/25/2018     08/25/2018     Lab Results   Component Value Date     08/25/2018    K 4 1 08/25/2018     08/25/2018    CO2 30 08/25/2018    BUN 12 08/25/2018    CREATININE 0 90 08/25/2018    GLUCOSE 115 08/25/2018       VTE Pharmacologic Prophylaxis: Enoxaparin (Lovenox)  VTE Mechanical Prophylaxis: sequential compression device    Assessment:    A 61-year-old male presenting with small-bowel obstruction likely due to adhesions    Plan:  -NG tube/IV fluids/ice chips  -IV Protonix  -will order obstruction series for today  -encourage out of bed/ambulation  -DVT prophylaxis    Patient Active Problem List   Diagnosis    [facility]          This text is generated with voice recognition software  There may be translation, syntax,  or grammatical errors  If you have any questions, please contact the dictating provider      Yrn Aguilera, AMELIE

## 2018-08-25 NOTE — INCIDENTAL FINDINGS
The following findings require follow up:  Radiographic finding   Finding: Right middle lobe 5 mm pulmonary nodule Based on current Fleischner Society 2017 Guidelines on incidental pulmonary nodule, no routine follow-up is needed if the patient is considered low risk for lung cancer  If the patient is considered high risk   for lung cancer, 12 month follow-up non-contrast chest CT is recommended        Follow up should be done within 1 month(s)    Please notify the following clinician to assist with the follow up:   Primary care provider

## 2018-08-26 VITALS
BODY MASS INDEX: 26.7 KG/M2 | DIASTOLIC BLOOD PRESSURE: 84 MMHG | SYSTOLIC BLOOD PRESSURE: 132 MMHG | HEART RATE: 91 BPM | RESPIRATION RATE: 18 BRPM | OXYGEN SATURATION: 97 % | TEMPERATURE: 98.3 F | WEIGHT: 180.78 LBS

## 2018-08-26 LAB
ANION GAP SERPL CALCULATED.3IONS-SCNC: 7 MMOL/L (ref 4–13)
BASOPHILS # BLD AUTO: 0.01 THOUSANDS/ΜL (ref 0–0.1)
BASOPHILS NFR BLD AUTO: 0 % (ref 0–1)
BUN SERPL-MCNC: 8 MG/DL (ref 5–25)
CALCIUM SERPL-MCNC: 8.4 MG/DL (ref 8.3–10.1)
CHLORIDE SERPL-SCNC: 106 MMOL/L (ref 100–108)
CO2 SERPL-SCNC: 28 MMOL/L (ref 21–32)
CREAT SERPL-MCNC: 0.86 MG/DL (ref 0.6–1.3)
EOSINOPHIL # BLD AUTO: 0.16 THOUSAND/ΜL (ref 0–0.61)
EOSINOPHIL NFR BLD AUTO: 3 % (ref 0–6)
ERYTHROCYTE [DISTWIDTH] IN BLOOD BY AUTOMATED COUNT: 16.1 % (ref 11.6–15.1)
GFR SERPL CREATININE-BSD FRML MDRD: 97 ML/MIN/1.73SQ M
GLUCOSE SERPL-MCNC: 96 MG/DL (ref 65–140)
HCT VFR BLD AUTO: 39.3 % (ref 36.5–49.3)
HGB BLD-MCNC: 12.7 G/DL (ref 12–17)
IMM GRANULOCYTES # BLD AUTO: 0.02 THOUSAND/UL (ref 0–0.2)
IMM GRANULOCYTES NFR BLD AUTO: 0 % (ref 0–2)
LYMPHOCYTES # BLD AUTO: 1.4 THOUSANDS/ΜL (ref 0.6–4.47)
LYMPHOCYTES NFR BLD AUTO: 30 % (ref 14–44)
MCH RBC QN AUTO: 26.3 PG (ref 26.8–34.3)
MCHC RBC AUTO-ENTMCNC: 32.3 G/DL (ref 31.4–37.4)
MCV RBC AUTO: 82 FL (ref 82–98)
MONOCYTES # BLD AUTO: 0.47 THOUSAND/ΜL (ref 0.17–1.22)
MONOCYTES NFR BLD AUTO: 10 % (ref 4–12)
NEUTROPHILS # BLD AUTO: 2.69 THOUSANDS/ΜL (ref 1.85–7.62)
NEUTS SEG NFR BLD AUTO: 57 % (ref 43–75)
NRBC BLD AUTO-RTO: 0 /100 WBCS
PLATELET # BLD AUTO: 243 THOUSANDS/UL (ref 149–390)
PMV BLD AUTO: 9.8 FL (ref 8.9–12.7)
POTASSIUM SERPL-SCNC: 3.9 MMOL/L (ref 3.5–5.3)
RBC # BLD AUTO: 4.82 MILLION/UL (ref 3.88–5.62)
SODIUM SERPL-SCNC: 141 MMOL/L (ref 136–145)
WBC # BLD AUTO: 4.75 THOUSAND/UL (ref 4.31–10.16)

## 2018-08-26 PROCEDURE — 80048 BASIC METABOLIC PNL TOTAL CA: CPT | Performed by: PHYSICIAN ASSISTANT

## 2018-08-26 PROCEDURE — 85025 COMPLETE CBC W/AUTO DIFF WBC: CPT | Performed by: PHYSICIAN ASSISTANT

## 2018-08-26 PROCEDURE — C9113 INJ PANTOPRAZOLE SODIUM, VIA: HCPCS | Performed by: PHYSICIAN ASSISTANT

## 2018-08-26 RX ADMIN — PANTOPRAZOLE SODIUM 40 MG: 40 INJECTION, POWDER, FOR SOLUTION INTRAVENOUS at 08:35

## 2018-08-26 RX ADMIN — SODIUM CHLORIDE, SODIUM LACTATE, POTASSIUM CHLORIDE, AND CALCIUM CHLORIDE 125 ML/HR: .6; .31; .03; .02 INJECTION, SOLUTION INTRAVENOUS at 01:13

## 2018-08-26 RX ADMIN — SODIUM CHLORIDE, SODIUM LACTATE, POTASSIUM CHLORIDE, AND CALCIUM CHLORIDE 125 ML/HR: .6; .31; .03; .02 INJECTION, SOLUTION INTRAVENOUS at 08:42

## 2018-08-26 RX ADMIN — ENOXAPARIN SODIUM 40 MG: 40 INJECTION SUBCUTANEOUS at 08:35

## 2018-08-26 NOTE — PROGRESS NOTES
Progress Note -Surgery PA  Cheryl Li 66 y o  male MRN: [id number]  Unit/Bed#: -01 Encounter: [id number]      Assessment:  66year old male with sbo  Plan:  -advance to soft diet  -dc fluids  -ambulate  -discharge later if doing well  Subjective/Objective     Subjective:Patient states he feels well  + BM tolerating clears  Wants to go home  Objective:     /84 (BP Location: Left arm)   Pulse 91   Temp 98 3 °F (36 8 °C) (Oral)   Resp 18   Wt 82 kg (180 lb 12 4 oz)   SpO2 97%   BMI 26 70 kg/m²   I/O last 24 hours: In: 4629 2 [P O :1745;  I V :2884 2]  Out: 1630 [Urine:1200; Emesis/NG output:430]        Intake/Output Summary (Last 24 hours) at 08/26/18 0755  Last data filed at 08/26/18 0730   Gross per 24 hour   Intake          3649 17 ml   Output             1310 ml   Net          2339 17 ml       Invasive Devices     Peripheral Intravenous Line            Peripheral IV 08/24/18 Right Antecubital 1 day                Physical Exam:  /84 (BP Location: Left arm)   Pulse 91   Temp 98 3 °F (36 8 °C) (Oral)   Resp 18   Wt 82 kg (180 lb 12 4 oz)   SpO2 97%   BMI 26 70 kg/m²   General appearance: alert and oriented, in no acute distress and alert  Lungs: clear to auscultation bilaterally  Heart: regular rate and rhythm, S1, S2 normal, no murmur, click, rub or gallop  Abdomen: soft, non-tender; bowel sounds normal; no masses,  no organomegaly      Current Facility-Administered Medications:     diphenhydrAMINE (BENADRYL) injection 25 mg, 25 mg, Intravenous, HS PRN, Luisa Keller PA-C    enoxaparin (LOVENOX) subcutaneous injection 40 mg, 40 mg, Subcutaneous, Daily, Lenna Sacks, PA-C, 40 mg at 08/25/18 0848    HYDROmorphone (DILAUDID) injection 1 mg, 1 mg, Intravenous, Q3H PRN, Luisa Keller PA-C    ketorolac (TORADOL) injection 15 mg, 15 mg, Intravenous, Q6H PRN, Luisa Keller PA-C    lactated ringers infusion, 125 mL/hr, Intravenous, Continuous, Lenna Sacks, AMELIE, Last Rate: 125 mL/hr at 08/26/18 0113, 125 mL/hr at 08/26/18 0113    melatonin tablet 6 mg, 6 mg, Oral, HS, Luisa Keller PA-C    pantoprazole (PROTONIX) injection 40 mg, 40 mg, Intravenous, Q24H [address], Dossie Erp, PA-C, 40 mg at 08/25/18 0848    phenol (CHLORASEPTIC) 1 4 % mucosal liquid 1 spray, 1 spray, Mouth/Throat, Q2H PRN, Dossie Erp, PA-C, 1 spray at 08/25/18 1035           Lab, Imaging and other studies:  CBC:   Lab Results   Component Value Date    WBC 4 75 08/26/2018    HGB 12 7 08/26/2018    HCT 39 3 08/26/2018    MCV 82 08/26/2018     08/26/2018    MCH 26 3 (L) 08/26/2018    MCHC 32 3 08/26/2018    RDW 16 1 (H) 08/26/2018    MPV 9 8 08/26/2018    NRBC 0 08/26/2018   , CMP:   Lab Results   Component Value Date     08/26/2018    K 3 9 08/26/2018     08/26/2018    CO2 28 08/26/2018    ANIONGAP 7 08/26/2018    BUN 8 08/26/2018    CREATININE 0 86 08/26/2018    GLUCOSE 96 08/26/2018    CALCIUM 8 4 08/26/2018    EGFR 97 08/26/2018         VTE Pharmacologic Prophylaxis: Sequential compression device (Venodyne)  and Enoxaparin (Lovenox)  VTE Mechanical Prophylaxis: sequential compression device    Rounds performed with nursing

## 2018-08-27 LAB
ATRIAL RATE: 87 BPM
P AXIS: -46 DEGREES
PR INTERVAL: 208 MS
QRS AXIS: 3 DEGREES
QRSD INTERVAL: 94 MS
QT INTERVAL: 364 MS
QTC INTERVAL: 438 MS
T WAVE AXIS: -2 DEGREES
VENTRICULAR RATE: 87 BPM

## 2018-08-27 PROCEDURE — 93010 ELECTROCARDIOGRAM REPORT: CPT | Performed by: INTERNAL MEDICINE

## 2019-06-18 ENCOUNTER — OFFICE VISIT (OUTPATIENT)
Dept: URGENT CARE | Age: 59
End: 2019-06-18
Payer: COMMERCIAL

## 2019-06-18 VITALS
HEART RATE: 100 BPM | RESPIRATION RATE: 19 BRPM | BODY MASS INDEX: 25.92 KG/M2 | WEIGHT: 175 LBS | OXYGEN SATURATION: 97 % | DIASTOLIC BLOOD PRESSURE: 85 MMHG | TEMPERATURE: 98.7 F | HEIGHT: 69 IN | SYSTOLIC BLOOD PRESSURE: 145 MMHG

## 2019-06-18 DIAGNOSIS — J01.40 ACUTE PANSINUSITIS, RECURRENCE NOT SPECIFIED: Primary | ICD-10-CM

## 2019-06-18 PROCEDURE — 99213 OFFICE O/P EST LOW 20 MIN: CPT | Performed by: PHYSICIAN ASSISTANT

## 2019-06-18 RX ORDER — TESTOSTERONE 30 MG/1.5ML
SOLUTION TOPICAL
COMMUNITY

## 2019-06-18 RX ORDER — PANTOPRAZOLE SODIUM 40 MG/1
40 TABLET, DELAYED RELEASE ORAL DAILY
Refills: 3 | COMMUNITY
Start: 2019-05-09

## 2019-06-18 RX ORDER — AMOXICILLIN AND CLAVULANATE POTASSIUM 875; 125 MG/1; MG/1
1 TABLET, FILM COATED ORAL EVERY 12 HOURS SCHEDULED
Qty: 14 TABLET | Refills: 0 | Status: SHIPPED | OUTPATIENT
Start: 2019-06-18 | End: 2019-06-25

## 2019-06-18 RX ORDER — LORATADINE 10 MG/1
TABLET ORAL
COMMUNITY
End: 2019-06-18 | Stop reason: SDUPTHER

## 2021-03-03 ENCOUNTER — OFFICE VISIT (OUTPATIENT)
Dept: URGENT CARE | Age: 61
End: 2021-03-03
Payer: COMMERCIAL

## 2021-03-03 VITALS — TEMPERATURE: 98.2 F | RESPIRATION RATE: 18 BRPM | OXYGEN SATURATION: 97 % | HEART RATE: 97 BPM

## 2021-03-03 DIAGNOSIS — J01.00 ACUTE NON-RECURRENT MAXILLARY SINUSITIS: Primary | ICD-10-CM

## 2021-03-03 PROCEDURE — 99213 OFFICE O/P EST LOW 20 MIN: CPT | Performed by: PHYSICIAN ASSISTANT

## 2021-03-03 RX ORDER — AMOXICILLIN AND CLAVULANATE POTASSIUM 875; 125 MG/1; MG/1
1 TABLET, FILM COATED ORAL EVERY 12 HOURS SCHEDULED
Qty: 20 TABLET | Refills: 0 | Status: SHIPPED | OUTPATIENT
Start: 2021-03-03 | End: 2021-03-13

## 2021-03-03 NOTE — PATIENT INSTRUCTIONS

## 2021-03-03 NOTE — PROGRESS NOTES
[facility]        NAME: Selwyn Zhao is a 64 y o  male  : 1960    MRN: [id number]  DATE: March 3, 2021  TIME: 10:34 AM    Assessment and Plan   Acute non-recurrent maxillary sinusitis [J01 00]  1  Acute non-recurrent maxillary sinusitis  amoxicillin-clavulanate (AUGMENTIN) 875-125 mg per tablet         Patient Instructions     Start antibiotics as directed  COVID testing is not warranted at this time due to patient having COVID within the last 90 days  No direct COVID exposure  He may go home and call his insurance company to see if they will cover a COVID test since he that that end of 90 day period  Follow up with PCP in 3-5 days  Proceed to  ER if symptoms worsen  Chief Complaint     Chief Complaint   Patient presents with    Nasal Congestion     denies cough, fever , c/o and sinus pressure , pain to eye and neck          History of Present Illness       Patient states for last 2-3 weeks he has been battling some sinus congestion that worsened over the last 3-4 days  He has sinus pain and pressure and headaches  He tried over-the-counter Claritin and nasal sprays without relief  He has also tried sinus cold medicine without relief  He did have COVID at the beginning of December and  Denies any direct COVID exposure  Review of Systems   Review of Systems   Constitutional: Negative  HENT: Positive for congestion, sinus pressure and sinus pain  Negative for sore throat  Respiratory: Negative  Cardiovascular: Negative  Gastrointestinal: Negative  Musculoskeletal: Negative  Neurological: Positive for headaches  Psychiatric/Behavioral: Negative            Current Medications       Current Outpatient Medications:     amoxicillin-clavulanate (AUGMENTIN) 875-125 mg per tablet, Take 1 tablet by mouth every 12 (twelve) hours for 10 days, Disp: 20 tablet, Rfl: 0    linaCLOtide (LINZESS) 72 MCG CAPS, Linzess 72 mcg capsule, Disp: , Rfl:     loratadine (CLARITIN) 10 mg tablet, Take 10 mg by mouth daily, Disp: , Rfl:     pantoprazole (PROTONIX) 20 mg tablet, Take 20 mg by mouth daily, Disp: , Rfl:     pantoprazole (PROTONIX) 40 mg tablet, Take 40 mg by mouth daily, Disp: , Rfl: 3    Testosterone (ANDROGEL TD), Place on the skin, Disp: , Rfl:     Testosterone 30 MG/ACT SOLN, Place on the skin, Disp: , Rfl:     Current Allergies     Allergies as of 03/03/2021 - Reviewed 03/03/2021   Allergen Reaction Noted    Pollen extract Sneezing 06/18/2019    Shellfish-derived products (food allergy) Diarrhea and GI Intolerance 06/18/2019            The following portions of the patient's history were reviewed and updated as appropriate: allergies, current medications, past family history, past medical history, past social history, past surgical history and problem list      Past Medical History:   Diagnosis Date    Arthritis     R knee    GERD (gastroesophageal reflux disease)        Past Surgical History:   Procedure Laterality Date    CHOLECYSTECTOMY  1996    MENISCECTOMY Right        Family History   Problem Relation Age of Onset    Arthritis Mother     Osteoporosis Mother     Heart disease Father     No Known Problems Sister          Medications have been verified  Objective   Pulse 97   Temp 98 2 °F (36 8 °C)   Resp 18   SpO2 97%        Physical Exam     Physical Exam  Vitals signs and nursing note reviewed  Constitutional:       General: He is not in acute distress  Appearance: Normal appearance  He is not ill-appearing, toxic-appearing or diaphoretic  HENT:      Head: Normocephalic and atraumatic  Right Ear: Tympanic membrane, ear canal and external ear normal       Left Ear: Tympanic membrane, ear canal and external ear normal       Nose: Congestion present  Comments: TTP over maxillary sinus     Mouth/Throat:      Mouth: Mucous membranes are moist       Pharynx: Oropharynx is clear  No posterior oropharyngeal erythema     Cardiovascular: Rate and Rhythm: Normal rate and regular rhythm  Pulses: Normal pulses  Pulmonary:      Effort: Pulmonary effort is normal       Breath sounds: Normal breath sounds  No wheezing  Skin:     General: Skin is warm and dry  Neurological:      General: No focal deficit present  Mental Status: He is alert and oriented to person, place, and time     Psychiatric:         Mood and Affect: Mood normal          Behavior: Behavior normal

## 2021-04-05 DIAGNOSIS — Z23 ENCOUNTER FOR IMMUNIZATION: ICD-10-CM

## 2021-08-05 ENCOUNTER — FOLLOW UP (OUTPATIENT)
Dept: URBAN - METROPOLITAN AREA CLINIC 6 | Facility: CLINIC | Age: 61
End: 2021-08-05

## 2021-08-05 DIAGNOSIS — H20.00: ICD-10-CM

## 2021-08-05 PROCEDURE — 92012 INTRM OPH EXAM EST PATIENT: CPT

## 2021-08-05 ASSESSMENT — TONOMETRY
OS_IOP_MMHG: 12
OD_IOP_MMHG: 13

## 2021-08-05 ASSESSMENT — VISUAL ACUITY
OS_SC: 20/40-2
OD_PH: 20/25
OD_SC: 20/30-2

## 2022-09-01 ENCOUNTER — HOSPITAL ENCOUNTER (INPATIENT)
Facility: HOSPITAL | Age: 62
LOS: 1 days | Discharge: LEFT BEFORE DISCHARGE | End: 2022-09-02
Attending: EMERGENCY MEDICINE | Admitting: STUDENT IN AN ORGANIZED HEALTH CARE EDUCATION/TRAINING PROGRAM
Payer: COMMERCIAL

## 2022-09-01 ENCOUNTER — APPOINTMENT (EMERGENCY)
Dept: CT IMAGING | Facility: HOSPITAL | Age: 62
End: 2022-09-01
Payer: COMMERCIAL

## 2022-09-01 DIAGNOSIS — K56.609 SMALL BOWEL OBSTRUCTION (HCC): Primary | ICD-10-CM

## 2022-09-01 LAB
ALBUMIN SERPL BCP-MCNC: 4.5 G/DL (ref 3.5–5)
ALP SERPL-CCNC: 76 U/L (ref 34–104)
ALT SERPL W P-5'-P-CCNC: 20 U/L (ref 7–52)
ANION GAP SERPL CALCULATED.3IONS-SCNC: 12 MMOL/L (ref 4–13)
AST SERPL W P-5'-P-CCNC: 19 U/L (ref 13–39)
BASOPHILS # BLD AUTO: 0.02 THOUSANDS/ΜL (ref 0–0.1)
BASOPHILS NFR BLD AUTO: 0 % (ref 0–1)
BILIRUB SERPL-MCNC: 1.01 MG/DL (ref 0.2–1)
BUN SERPL-MCNC: 14 MG/DL (ref 5–25)
CALCIUM SERPL-MCNC: 10.2 MG/DL (ref 8.4–10.2)
CHLORIDE SERPL-SCNC: 101 MMOL/L (ref 96–108)
CO2 SERPL-SCNC: 25 MMOL/L (ref 21–32)
CREAT SERPL-MCNC: 0.78 MG/DL (ref 0.6–1.3)
EOSINOPHIL # BLD AUTO: 0 THOUSAND/ΜL (ref 0–0.61)
EOSINOPHIL NFR BLD AUTO: 0 % (ref 0–6)
ERYTHROCYTE [DISTWIDTH] IN BLOOD BY AUTOMATED COUNT: 13.5 % (ref 11.6–15.1)
GFR SERPL CREATININE-BSD FRML MDRD: 98 ML/MIN/1.73SQ M
GLUCOSE SERPL-MCNC: 128 MG/DL (ref 65–140)
HCT VFR BLD AUTO: 51.1 % (ref 36.5–49.3)
HGB BLD-MCNC: 17.4 G/DL (ref 12–17)
IMM GRANULOCYTES # BLD AUTO: 0.05 THOUSAND/UL (ref 0–0.2)
IMM GRANULOCYTES NFR BLD AUTO: 1 % (ref 0–2)
LACTATE SERPL-SCNC: 1 MMOL/L (ref 0.5–2)
LIPASE SERPL-CCNC: 18 U/L (ref 11–82)
LYMPHOCYTES # BLD AUTO: 0.54 THOUSANDS/ΜL (ref 0.6–4.47)
LYMPHOCYTES NFR BLD AUTO: 5 % (ref 14–44)
MCH RBC QN AUTO: 30.5 PG (ref 26.8–34.3)
MCHC RBC AUTO-ENTMCNC: 34.1 G/DL (ref 31.4–37.4)
MCV RBC AUTO: 90 FL (ref 82–98)
MONOCYTES # BLD AUTO: 0.25 THOUSAND/ΜL (ref 0.17–1.22)
MONOCYTES NFR BLD AUTO: 2 % (ref 4–12)
NEUTROPHILS # BLD AUTO: 10.03 THOUSANDS/ΜL (ref 1.85–7.62)
NEUTS SEG NFR BLD AUTO: 92 % (ref 43–75)
NRBC BLD AUTO-RTO: 0 /100 WBCS
PLATELET # BLD AUTO: 280 THOUSANDS/UL (ref 149–390)
PMV BLD AUTO: 9.8 FL (ref 8.9–12.7)
POTASSIUM SERPL-SCNC: 4.1 MMOL/L (ref 3.5–5.3)
PROT SERPL-MCNC: 8.1 G/DL (ref 6.4–8.4)
RBC # BLD AUTO: 5.71 MILLION/UL (ref 3.88–5.62)
SODIUM SERPL-SCNC: 138 MMOL/L (ref 135–147)
WBC # BLD AUTO: 10.89 THOUSAND/UL (ref 4.31–10.16)

## 2022-09-01 PROCEDURE — 83605 ASSAY OF LACTIC ACID: CPT | Performed by: PHYSICIAN ASSISTANT

## 2022-09-01 PROCEDURE — 96361 HYDRATE IV INFUSION ADD-ON: CPT

## 2022-09-01 PROCEDURE — 96374 THER/PROPH/DIAG INJ IV PUSH: CPT

## 2022-09-01 PROCEDURE — 96375 TX/PRO/DX INJ NEW DRUG ADDON: CPT

## 2022-09-01 PROCEDURE — 80053 COMPREHEN METABOLIC PANEL: CPT | Performed by: EMERGENCY MEDICINE

## 2022-09-01 PROCEDURE — 36415 COLL VENOUS BLD VENIPUNCTURE: CPT

## 2022-09-01 PROCEDURE — 85025 COMPLETE CBC W/AUTO DIFF WBC: CPT | Performed by: EMERGENCY MEDICINE

## 2022-09-01 PROCEDURE — 74177 CT ABD & PELVIS W/CONTRAST: CPT

## 2022-09-01 PROCEDURE — 99285 EMERGENCY DEPT VISIT HI MDM: CPT

## 2022-09-01 PROCEDURE — G1004 CDSM NDSC: HCPCS

## 2022-09-01 PROCEDURE — 83690 ASSAY OF LIPASE: CPT | Performed by: EMERGENCY MEDICINE

## 2022-09-01 PROCEDURE — 99285 EMERGENCY DEPT VISIT HI MDM: CPT | Performed by: PHYSICIAN ASSISTANT

## 2022-09-01 RX ORDER — ONDANSETRON 2 MG/ML
4 INJECTION INTRAMUSCULAR; INTRAVENOUS ONCE
Status: COMPLETED | OUTPATIENT
Start: 2022-09-01 | End: 2022-09-01

## 2022-09-01 RX ORDER — MORPHINE SULFATE 4 MG/ML
4 INJECTION, SOLUTION INTRAMUSCULAR; INTRAVENOUS ONCE
Status: COMPLETED | OUTPATIENT
Start: 2022-09-01 | End: 2022-09-01

## 2022-09-01 RX ADMIN — SODIUM CHLORIDE 1000 ML: 0.9 INJECTION, SOLUTION INTRAVENOUS at 23:04

## 2022-09-01 RX ADMIN — ONDANSETRON 4 MG: 2 INJECTION INTRAMUSCULAR; INTRAVENOUS at 23:12

## 2022-09-01 RX ADMIN — MORPHINE SULFATE 4 MG: 4 INJECTION INTRAVENOUS at 23:04

## 2022-09-01 RX ADMIN — IOHEXOL 75 ML: 350 INJECTION, SOLUTION INTRAVENOUS at 23:56

## 2022-09-02 VITALS
OXYGEN SATURATION: 97 % | BODY MASS INDEX: 25.49 KG/M2 | WEIGHT: 172.6 LBS | RESPIRATION RATE: 16 BRPM | DIASTOLIC BLOOD PRESSURE: 76 MMHG | SYSTOLIC BLOOD PRESSURE: 106 MMHG | HEART RATE: 103 BPM | TEMPERATURE: 98.8 F

## 2022-09-02 LAB
ANION GAP SERPL CALCULATED.3IONS-SCNC: 6 MMOL/L (ref 4–13)
BASOPHILS # BLD AUTO: 0.02 THOUSANDS/ΜL (ref 0–0.1)
BASOPHILS NFR BLD AUTO: 0 % (ref 0–1)
BUN SERPL-MCNC: 15 MG/DL (ref 5–25)
CALCIUM SERPL-MCNC: 8.8 MG/DL (ref 8.4–10.2)
CHLORIDE SERPL-SCNC: 104 MMOL/L (ref 96–108)
CO2 SERPL-SCNC: 28 MMOL/L (ref 21–32)
CREAT SERPL-MCNC: 0.87 MG/DL (ref 0.6–1.3)
EOSINOPHIL # BLD AUTO: 0.01 THOUSAND/ΜL (ref 0–0.61)
EOSINOPHIL NFR BLD AUTO: 0 % (ref 0–6)
ERYTHROCYTE [DISTWIDTH] IN BLOOD BY AUTOMATED COUNT: 13.9 % (ref 11.6–15.1)
GFR SERPL CREATININE-BSD FRML MDRD: 93 ML/MIN/1.73SQ M
GLUCOSE SERPL-MCNC: 109 MG/DL (ref 65–140)
HCT VFR BLD AUTO: 45.2 % (ref 36.5–49.3)
HGB BLD-MCNC: 15.3 G/DL (ref 12–17)
IMM GRANULOCYTES # BLD AUTO: 0.04 THOUSAND/UL (ref 0–0.2)
IMM GRANULOCYTES NFR BLD AUTO: 0 % (ref 0–2)
LYMPHOCYTES # BLD AUTO: 0.91 THOUSANDS/ΜL (ref 0.6–4.47)
LYMPHOCYTES NFR BLD AUTO: 8 % (ref 14–44)
MAGNESIUM SERPL-MCNC: 2 MG/DL (ref 1.9–2.7)
MCH RBC QN AUTO: 31 PG (ref 26.8–34.3)
MCHC RBC AUTO-ENTMCNC: 33.8 G/DL (ref 31.4–37.4)
MCV RBC AUTO: 92 FL (ref 82–98)
MONOCYTES # BLD AUTO: 0.72 THOUSAND/ΜL (ref 0.17–1.22)
MONOCYTES NFR BLD AUTO: 7 % (ref 4–12)
NEUTROPHILS # BLD AUTO: 9.11 THOUSANDS/ΜL (ref 1.85–7.62)
NEUTS SEG NFR BLD AUTO: 85 % (ref 43–75)
NRBC BLD AUTO-RTO: 0 /100 WBCS
PHOSPHATE SERPL-MCNC: 4.5 MG/DL (ref 2.3–4.1)
PLATELET # BLD AUTO: 271 THOUSANDS/UL (ref 149–390)
PMV BLD AUTO: 9.9 FL (ref 8.9–12.7)
POTASSIUM SERPL-SCNC: 4.2 MMOL/L (ref 3.5–5.3)
RBC # BLD AUTO: 4.94 MILLION/UL (ref 3.88–5.62)
SODIUM SERPL-SCNC: 138 MMOL/L (ref 135–147)
WBC # BLD AUTO: 10.81 THOUSAND/UL (ref 4.31–10.16)

## 2022-09-02 PROCEDURE — 84100 ASSAY OF PHOSPHORUS: CPT | Performed by: STUDENT IN AN ORGANIZED HEALTH CARE EDUCATION/TRAINING PROGRAM

## 2022-09-02 PROCEDURE — 83735 ASSAY OF MAGNESIUM: CPT | Performed by: STUDENT IN AN ORGANIZED HEALTH CARE EDUCATION/TRAINING PROGRAM

## 2022-09-02 PROCEDURE — 0D9670Z DRAINAGE OF STOMACH WITH DRAINAGE DEVICE, VIA NATURAL OR ARTIFICIAL OPENING: ICD-10-PCS | Performed by: STUDENT IN AN ORGANIZED HEALTH CARE EDUCATION/TRAINING PROGRAM

## 2022-09-02 PROCEDURE — 80048 BASIC METABOLIC PNL TOTAL CA: CPT | Performed by: STUDENT IN AN ORGANIZED HEALTH CARE EDUCATION/TRAINING PROGRAM

## 2022-09-02 PROCEDURE — 99223 1ST HOSP IP/OBS HIGH 75: CPT | Performed by: STUDENT IN AN ORGANIZED HEALTH CARE EDUCATION/TRAINING PROGRAM

## 2022-09-02 PROCEDURE — 96361 HYDRATE IV INFUSION ADD-ON: CPT

## 2022-09-02 PROCEDURE — 85025 COMPLETE CBC W/AUTO DIFF WBC: CPT | Performed by: STUDENT IN AN ORGANIZED HEALTH CARE EDUCATION/TRAINING PROGRAM

## 2022-09-02 RX ORDER — HYDROMORPHONE HCL/PF 1 MG/ML
0.5 SYRINGE (ML) INJECTION
Status: DISCONTINUED | OUTPATIENT
Start: 2022-09-02 | End: 2022-09-02 | Stop reason: HOSPADM

## 2022-09-02 RX ORDER — ONDANSETRON 2 MG/ML
4 INJECTION INTRAMUSCULAR; INTRAVENOUS EVERY 4 HOURS PRN
Status: DISCONTINUED | OUTPATIENT
Start: 2022-09-02 | End: 2022-09-02 | Stop reason: HOSPADM

## 2022-09-02 RX ORDER — XYLITOL/YERBA SANTA
5 AEROSOL, SPRAY WITH PUMP (ML) MUCOUS MEMBRANE 4 TIMES DAILY PRN
Status: DISCONTINUED | OUTPATIENT
Start: 2022-09-02 | End: 2022-09-02 | Stop reason: HOSPADM

## 2022-09-02 RX ORDER — HEPARIN SODIUM 5000 [USP'U]/ML
5000 INJECTION, SOLUTION INTRAVENOUS; SUBCUTANEOUS EVERY 8 HOURS SCHEDULED
Status: DISCONTINUED | OUTPATIENT
Start: 2022-09-02 | End: 2022-09-02 | Stop reason: HOSPADM

## 2022-09-02 RX ORDER — SODIUM CHLORIDE, SODIUM LACTATE, POTASSIUM CHLORIDE, CALCIUM CHLORIDE 600; 310; 30; 20 MG/100ML; MG/100ML; MG/100ML; MG/100ML
50 INJECTION, SOLUTION INTRAVENOUS CONTINUOUS
Status: DISCONTINUED | OUTPATIENT
Start: 2022-09-02 | End: 2022-09-02 | Stop reason: HOSPADM

## 2022-09-02 RX ORDER — HYDROMORPHONE HCL IN WATER/PF 6 MG/30 ML
0.2 PATIENT CONTROLLED ANALGESIA SYRINGE INTRAVENOUS
Status: DISCONTINUED | OUTPATIENT
Start: 2022-09-02 | End: 2022-09-02 | Stop reason: HOSPADM

## 2022-09-02 RX ORDER — SODIUM CHLORIDE, SODIUM LACTATE, POTASSIUM CHLORIDE, CALCIUM CHLORIDE 600; 310; 30; 20 MG/100ML; MG/100ML; MG/100ML; MG/100ML
125 INJECTION, SOLUTION INTRAVENOUS CONTINUOUS
Status: DISCONTINUED | OUTPATIENT
Start: 2022-09-02 | End: 2022-09-02

## 2022-09-02 RX ADMIN — Medication 1 SPRAY: at 02:20

## 2022-09-02 RX ADMIN — SODIUM CHLORIDE, SODIUM LACTATE, POTASSIUM CHLORIDE, AND CALCIUM CHLORIDE 125 ML/HR: .6; .31; .03; .02 INJECTION, SOLUTION INTRAVENOUS at 02:15

## 2022-09-02 RX ADMIN — HEPARIN SODIUM 5000 UNITS: 5000 INJECTION, SOLUTION INTRAVENOUS; SUBCUTANEOUS at 05:05

## 2022-09-02 NOTE — ED NOTES
18 FR NGT successfully placed with 150ml yellow return  Pt unable to tolerate NGT and requested removal  NGT removed and surgery notified  Per surgical resident, reattempt NGT if pt c/o of nausea/emesis  Pt made aware and is agreeable       Antonio Salcedo RN  09/02/22 [address], RN  09/02/22 8556

## 2022-09-02 NOTE — UTILIZATION REVIEW
Initial Clinical Review    Admission: Date/Time/Statement:   Admission Orders (From admission, onward)     Ordered        09/02/22 0156  Inpatient Admission  Once                      Orders Placed This Encounter   Procedures    Inpatient Admission     Standing Status:   Standing     Number of Occurrences:   1     Order Specific Question:   Level of Care     Answer:   Med Surg [16]     Order Specific Question:   Estimated length of stay     Answer:   More than 2 Midnights     Order Specific Question:   Certification     Answer:   I certify that inpatient services are medically necessary for this patient for a duration of greater than two midnights  See H&P and MD Progress Notes for additional information about the patient's course of treatment  ED Arrival Information     Expected   -    Arrival   9/1/2022 21:06    Acuity   Urgent            Means of arrival   Walk-In    Escorted by   Spouse    Service   Surgery-General    Admission type   Urgent            Arrival complaint   ABD PAIN           Chief Complaint   Patient presents with    Abdominal Pain     Pt c/o generalized abd pain  Believes he has a bowel obstruction  Is not passing gas  No bowel sounds noted on L side in triage  Initial Presentation: 63 y o  male with hx SBO managed conservatively  Hx lap antonia, open RIH who presents to ED from home with abdominal pain, nausea after eating foods that bind him up   Tried taking his Linzess without relief  He forced himself to throw up for relief approximately six times  last bm in ED on arrival, not passing flatus currently   On exam, pt tachycardic,abdomen distended, generalized abdominal tenderness, dry mucous membranes   Labs WBC 11  CT A/P shows dilated SB loops up to 4 cm and transition point in RLQ, very small amount of fluid in pelvis   Pt given IV analgesic, IV antiemetic, IVF in ED  Pt admitted as Inpatient with SBO  Plan - NGT insertion, IVF, NPO, serial abdominal exams, I/O, DVT ppx   Pain control  Consider gastrofin challenge later today if not progressing   Update-18 FR NGT successfully placed with 150ml yellow return  Pt unable to tolerate NGT and requested removal  NGT removed and surgery notified  Per surgical resident, reattempt NGT if pt c/o of nausea/emesis  Pt left AMA @ 04 00 14 32 96 9/2/22  ED Triage Vitals   Temperature Pulse Respirations Blood Pressure SpO2   09/01/22 2127 09/01/22 2127 09/01/22 2127 09/01/22 2127 09/01/22 2127   98 8 °F (37 1 °C) 104 18 170/89 97 %      Temp src Heart Rate Source Patient Position - Orthostatic VS BP Location FiO2 (%)   -- 09/01/22 2303 09/01/22 2303 09/01/22 2303 --    Monitor Lying Left arm       Pain Score       09/01/22 2303       10 - Worst Possible Pain          Wt Readings from Last 1 Encounters:   09/02/22 78 3 kg (172 lb 9 6 oz)     Additional Vital Signs:   Date/Time Temp Pulse Resp BP MAP (mmHg) SpO2   09/02/22 0826 -- 103 16 106/76 -- 97 %   09/02/22 0315 -- 106 Abnormal  18 132/72 93 95 %   09/02/22 0030 -- 99 18 153/86 111 96 %   09/01/22 2330 -- 99 -- 153/82 111 95 %   09/01/22 2303 -- 103 18 164/101 Abnormal  127 97 %       Pertinent Labs/Diagnostic Test Results:   CT abdomen pelvis with contrast   Final Result by Grant Chen MD (09/02 8308)      Small bowel obstruction  Please see discussion  Surgical consultation and follow-up is recommended  STOMACH AND BOWEL:  There are multiple loops of abnormally dilated fluid-filled small bowel, measuring up to 4 cm diameter  Numerous air-fluid levels are present  There is a transition zone in the right lower quadrant (series 301 image 57)  Some of   the nondilated and mildly dilated small bowel loops demonstrate bowel wall thickening  There is no evidence of pneumatosis intestinalis  There is colonic diverticulosis without evidence of acute diverticulitis  Other nonemergent findings, as described above  Please see discussion               I personally discussed this study with RAMONA JEVON CASEY on 9/2/2022 at 12:51 AM                      Workstation performed: [id number]               Results from last 7 days   Lab Units 09/02/22  0500 09/01/22  2129   WBC Thousand/uL 10 81* 10 89*   HEMOGLOBIN g/dL 15 3 17 4*   HEMATOCRIT % 45 2 51 1*   PLATELETS Thousands/uL 271 280   NEUTROS ABS Thousands/µL 9 11* 10 03*         Results from last 7 days   Lab Units 09/02/22  0500 09/01/22  2129   SODIUM mmol/L 138 138   POTASSIUM mmol/L 4 2 4 1   CHLORIDE mmol/L 104 101   CO2 mmol/L 28 25   ANION GAP mmol/L 6 12   BUN mg/dL 15 14   CREATININE mg/dL 0 87 0 78   EGFR ml/min/1 73sq m 93 98   CALCIUM mg/dL 8 8 10 2   MAGNESIUM mg/dL 2 0  --    PHOSPHORUS mg/dL 4 5*  --      Results from last 7 days   Lab Units 09/01/22 2129   AST U/L 19   ALT U/L 20   ALK PHOS U/L 76   TOTAL PROTEIN g/dL 8 1   ALBUMIN g/dL 4 5   TOTAL BILIRUBIN mg/dL 1 01*         Results from last 7 days   Lab Units 09/02/22  0500 09/01/22  2129   GLUCOSE RANDOM mg/dL 109 128               Results from last 7 days   Lab Units 09/01/22  2304   LACTIC ACID mmol/L 1 0               Results from last 7 days   Lab Units 09/01/22  2129   LIPASE u/L 18             ED Treatment:   Medication Administration from 09/01/2022 2106 to 09/02/2022 0356       Date/Time Order Dose Route Action     09/01/2022 2304 morphine injection 4 mg 4 mg Intravenous Given     09/01/2022 2304 sodium chloride 0 9 % bolus 1,000 mL 1,000 mL Intravenous New Bag     09/01/2022 2312 ondansetron (ZOFRAN) injection 4 mg 4 mg Intravenous Given     09/01/2022 2356 iohexol (OMNIPAQUE) 350 MG/ML injection (MULTI-DOSE) 75 mL 75 mL Intravenous Given     09/02/2022 0220 phenol (CHLORASEPTIC) 1 4 % mucosal liquid 1 spray 1 spray Mouth/Throat Given     09/02/2022 0215 lactated ringers infusion 125 mL/hr Intravenous New Bag        Past Medical History:   Diagnosis Date    Arthritis     R knee    GERD (gastroesophageal reflux disease)      Present on Admission:   [facility]      Admitting Diagnosis: Small bowel obstruction ([facility] ) [K56 609]  Abdominal pain [R10 9]  Age/Sex: 63 y o  male  Admission Orders:  Scheduled Medications:  heparin (porcine) subcutaneous injection 5,000 Units  Dose: 5,000 Units  Freq: Every 8 hours scheduled Route: SC  Start: 09/02/22 0600 End: 09/02/22 1250  Continuous IV Infusions:  lactated ringers infusion  Rate: 125 mL/hr Dose: 125 mL/hr  Freq: Continuous Route: IV  Last Dose: Stopped (09/02/22 1047)  Start: 09/02/22 0200   PRN Meds:  HYDROmorphone (DILAUDID) injection 0 5 mg  Dose: 0 5 mg  Freq: Every 3 hours PRN Route: IV  PRN Reason: breakthrough pain  Start: 09/02/22 0141 End: 09/02/22 1250  HYDROmorphone HCl (DILAUDID) injection 0 2 mg  Dose: 0 2 mg  Freq: Every 3 hours PRN Route: IV  PRN Reasons: moderate pain,severe pain  Start: 09/02/22 0141 End: 09/02/22 1250  ondansetron (ZOFRAN) injection 4 mg  Dose: 4 mg  Freq: Every 4 hours PRN Route: IV  PRN Reason: nausea  Start: 09/02/22 0155 End: 09/02/22 1250  phenol (CHLORASEPTIC) 1 4 % mucosal liquid 1 spray  Dose: 1 spray  Freq: Every 2 hour PRN Route: MT  PRN Reason: sore throat  Start: 09/02/22 0141 End: 09/02/22 1250  saliva substitute (MOUTH KOTE) mucosal solution 5 spray  Dose: 5 spray  Freq: 4 times daily PRN Route: MT  PRN Reason: dry mouth  Start: 09/02/22 0140 End: 09/02/22 1250    NPO   NGT [facility]    Network Utilization Review Department  ATTENTION: Please call with any questions or concerns to [phone] and carefully listen to the prompts so that you are directed to the right person  All voicemails are confidential   Eben Feliz all requests for admission clinical reviews, approved or denied determinations and any other requests to dedicated fax number below belonging to the campus where the patient is receiving treatment   List of dedicated fax numbers for the Facilities:  FACILITY NAME UR FAX NUMBER   ADMISSION DENIALS (Administrative/Medical Necessity) [phone]   [facility] [address] (Maternity/NICU/Pediatrics) [address] [facility],[address] [address] [place] [address]  [phone]   [address] [place] [address] [facility] [address] [address] [place] [address] [address] Dyllan James [address] [facility] [place] [address] [phone]

## 2022-09-02 NOTE — ED PROVIDER NOTES
History  Chief Complaint   Patient presents with    Abdominal Pain     Pt c/o generalized abd pain  Believes he has a bowel obstruction  Is not passing gas  No bowel sounds noted on L side in triage  Patient is a 33-year-old male presenting to the emergency room for evaluation  Patient states this evening he developed crampy abdominal pain and nausea  He states this feels similar to a previous time he had a small bowel obstruction  Patient's last bowel movement was this evening and was loose  Patient had multiple episodes of vomiting for which he made himself vomit due to the abdominal discomfort  He states over the last couple of days he has been eating corn and peanuts which usually causes him to have stomach issues  He denies any fever, chills, chest pain, shortness of breath, headache, dizziness  History provided by:  Patient   used: No        Prior to Admission Medications   Prescriptions Last Dose Informant Patient Reported? Taking? Testosterone (ANDROGEL TD)   Yes No   Sig: Place on the skin   Testosterone 30 MG/ACT SOLN   Yes No   Sig: Place on the skin   linaCLOtide (LINZESS) 72 MCG CAPS   Yes No   Sig: Linzess 72 mcg capsule   loratadine (CLARITIN) 10 mg tablet   Yes No   Sig: Take 10 mg by mouth daily   pantoprazole (PROTONIX) 20 mg tablet   Yes No   Sig: Take 20 mg by mouth daily   pantoprazole (PROTONIX) 40 mg tablet   Yes No   Sig: Take 40 mg by mouth daily      Facility-Administered Medications: None       Past Medical History:   Diagnosis Date    Arthritis     R knee    GERD (gastroesophageal reflux disease)        Past Surgical History:   Procedure Laterality Date    CHOLECYSTECTOMY  1996    MENISCECTOMY Right        Family History   Problem Relation Age of Onset    Arthritis Mother     Osteoporosis Mother     Heart disease Father     No Known Problems Sister      I have reviewed and agree with the history as documented      E-Cigarette/Vaping E-Cigarette/Vaping Substances     Social History     Tobacco Use    Smoking status: Never Smoker    Smokeless tobacco: Never Used   Substance Use Topics    Alcohol use: No    Drug use: No       Review of Systems   Constitutional: Negative for chills and fever  HENT: Negative for ear pain and sore throat  Eyes: Negative for pain and visual disturbance  Respiratory: Negative for cough and shortness of breath  Cardiovascular: Negative for chest pain and palpitations  Gastrointestinal: Positive for abdominal pain, nausea and vomiting  Genitourinary: Negative for dysuria and hematuria  Musculoskeletal: Negative for arthralgias and back pain  Skin: Negative for color change and rash  Neurological: Negative for seizures and syncope  All other systems reviewed and are negative  Physical Exam  Physical Exam  Vitals and nursing note reviewed  Constitutional:       Appearance: He is well-developed  HENT:      Head: Normocephalic and atraumatic  Mouth/Throat:      Mouth: Mucous membranes are dry  Eyes:      Conjunctiva/sclera: Conjunctivae normal    Cardiovascular:      Rate and Rhythm: Normal rate and regular rhythm  Heart sounds: No murmur heard  Pulmonary:      Effort: Pulmonary effort is normal  No respiratory distress  Breath sounds: Normal breath sounds  Abdominal:      Palpations: Abdomen is soft  Tenderness: There is generalized abdominal tenderness  Musculoskeletal:      Cervical back: Neck supple  Skin:     General: Skin is warm and dry  Neurological:      Mental Status: He is alert           Vital Signs  ED Triage Vitals   Temperature Pulse Respirations Blood Pressure SpO2   09/01/22 2127 09/01/22 2127 09/01/22 2127 09/01/22 2127 09/01/22 2127   98 8 °F (37 1 °C) 104 18 170/89 97 %      Temp src Heart Rate Source Patient Position - Orthostatic VS BP Location FiO2 (%)   -- 09/01/22 2303 09/01/22 2303 09/01/22 2303 --    Monitor Lying Left arm Pain Score       09/01/22 2303       10 - Worst Possible Pain           Vitals:    09/01/22 2127 09/01/22 2303 09/01/22 2330 09/02/22 0030   BP: 170/89 (!) 164/101 153/82 153/86   Pulse: 104 103 99 99   Patient Position - Orthostatic VS:  Lying  Lying         ED Medications  Medications   saliva substitute (MOUTH KOTE) mucosal solution 5 spray (has no administration in time range)   phenol (CHLORASEPTIC) 1 4 % mucosal liquid 1 spray (1 spray Mouth/Throat Given 9/2/22 0220)   HYDROmorphone (DILAUDID) injection 0 5 mg (has no administration in time range)   HYDROmorphone HCl (DILAUDID) injection 0 2 mg (has no administration in time range)   ondansetron (ZOFRAN) injection 4 mg (has no administration in time range)   lactated ringers infusion (125 mL/hr Intravenous New Bag 9/2/22 0215)   heparin (porcine) subcutaneous injection 5,000 Units (has no administration in time range)   morphine injection 4 mg (4 mg Intravenous Given 9/1/22 2304)   sodium chloride 0 9 % bolus 1,000 mL (1,000 mL Intravenous New Bag 9/1/22 2304)   ondansetron (ZOFRAN) injection 4 mg (4 mg Intravenous Given 9/1/22 2312)   iohexol (OMNIPAQUE) 350 MG/ML injection (MULTI-DOSE) 75 mL (75 mL Intravenous Given 9/1/22 2356)       Diagnostic Studies  Results Reviewed     Procedure Component Value Units Date/Time    Lactic acid, plasma [[id number]]  (Normal) Collected: 09/01/22 2304    Lab Status: Final result Specimen: Blood from Arm, Left Updated: 09/01/22 2342     LACTIC ACID 1 0 mmol/L     Narrative:      Result may be elevated if tourniquet was used during collection      CBC and differential [[id number]]  (Abnormal) Collected: 09/01/22 2129    Lab Status: Final result Specimen: Blood from Arm, Left Updated: 09/01/22 2243     WBC 10 89 Thousand/uL      RBC 5 71 Million/uL      Hemoglobin 17 4 g/dL      Hematocrit 51 1 %      MCV 90 fL      MCH 30 5 pg      MCHC 34 1 g/dL      RDW 13 5 %      MPV 9 8 fL      Platelets 088 Thousands/uL      nRBC 0 /100 WBCs      Neutrophils Relative 92 %      Immat GRANS % 1 %      Lymphocytes Relative 5 %      Monocytes Relative 2 %      Eosinophils Relative 0 %      Basophils Relative 0 %      Neutrophils Absolute 10 03 Thousands/µL      Immature Grans Absolute 0 05 Thousand/uL      Lymphocytes Absolute 0 54 Thousands/µL      Monocytes Absolute 0 25 Thousand/µL      Eosinophils Absolute 0 00 Thousand/µL      Basophils Absolute 0 02 Thousands/µL     Narrative: This is an appended report  These results have been appended to a previously verified report      Comprehensive metabolic panel [[id number]]  (Abnormal) Collected: 09/01/22 2129    Lab Status: Final result Specimen: Blood from Arm, Left Updated: 09/01/22 2159     Sodium 138 mmol/L      Potassium 4 1 mmol/L      Chloride 101 mmol/L      CO2 25 mmol/L      ANION GAP 12 mmol/L      BUN 14 mg/dL      Creatinine 0 78 mg/dL      Glucose 128 mg/dL      Calcium 10 2 mg/dL      AST 19 U/L      ALT 20 U/L      Alkaline Phosphatase 76 U/L      Total Protein 8 1 g/dL      Albumin 4 5 g/dL      Total Bilirubin 1 01 mg/dL      eGFR 98 ml/min/1 73sq m     Narrative:      [place] guidelines for Chronic Kidney Disease (CKD):     Stage 1 with normal or high GFR (GFR > 90 mL/min/1 73 square meters)    Stage 2 Mild CKD (GFR = 60-89 mL/min/1 73 square meters)    Stage 3A Moderate CKD (GFR = 45-59 mL/min/1 73 square meters)    Stage 3B Moderate CKD (GFR = 30-44 mL/min/1 73 square meters)    Stage 4 Severe CKD (GFR = 15-29 mL/min/1 73 square meters)    Stage 5 End Stage CKD (GFR <15 mL/min/1 73 square meters)  Note: GFR calculation is accurate only with a steady state creatinine    Lipase [[id number]]  (Normal) Collected: 09/01/22 2129    Lab Status: Final result Specimen: Blood from Arm, Left Updated: 09/01/22 2159     Lipase 18 u/L                  CT abdomen pelvis with contrast   Final Result by Ayde Ge MD (09/02 2391)      Small bowel obstruction, as described above  Please see discussion  Surgical consultation and follow-up is recommended  Other nonemergent findings, as described above  Please see discussion  I personally discussed this study with Rush Issa on 9/2/2022 at 12:51 AM                      Workstation performed: [id number]                      ED Course  ED Course as of 09/02/22 0230   Fri Sep 02, 2022   Yusuf Burch  Surgery resident notified            MDM  Number of Diagnoses or Management Options  Small bowel obstruction [facility]): new and requires workup     Amount and/or Complexity of Data Reviewed  Clinical lab tests: ordered and reviewed  Tests in the radiology section of CPT®: ordered and reviewed    Risk of Complications, Morbidity, and/or Mortality  Presenting problems: high  Diagnostic procedures: high  Management options: high    Patient Progress  Patient progress: stable      Disposition  Final diagnoses:   Small bowel obstruction ([address] )     Time reflects when diagnosis was documented in both MDM as applicable and the Disposition within this note     Time User Action Codes Description Comment    9/2/2022 12:54 AM Syeda Abraham Add [K56 529] Small bowel obstruction [facility])       ED Disposition     ED Disposition   Admit    Condition   Stable    Date/Time   Fri Sep 2, 2022  1:42 AM    Comment   Case was discussed with surgery and the patient's admission status was agreed to be Admission Status: inpatient status to the service of Dr Janet Draper   Follow-up Information    None         Patient's Medications   Discharge Prescriptions    No medications on file       No discharge procedures on file      PDMP Review       Value Time User    PDMP Reviewed  Yes 9/1/2022 10:18 PM Jadiel Uriostegui MD          ED Provider  Electronically Signed by           Roque Estrada PA-C  09/02/22 0234

## 2022-09-06 NOTE — UTILIZATION REVIEW
Notification of Discharge   This is a Notification of Discharge from our facility [address]  Please be advised that this patient has been discharge from our facility  Below you will find the admission and discharge date and time including the patients disposition  UTILIZATION REVIEW CONTACT:  Vibha Brock  Utilization   Network Utilization Review Department  Phone: [phone] x carefully listen to the prompts  All voicemails are confidential   Email: [email or web address]     PHYSICIAN ADVISORY SERVICES:  FOR [id number] REVIEW - MEDICAL NECESSITY DENIAL  Phone: [phone]  Fax: [phone]  Email: [email or web address]     PRESENTATION DATE: 9/1/2022 10:02 PM  OBERVATION ADMISSION DATE:   INPATIENT ADMISSION DATE: 9/2/22  1:56 AM   DISCHARGE DATE: 9/2/2022 10:49 AM  DISPOSITION: Left against medical advice or discontinued care Left against medical advice or discontinued care      IMPORTANT INFORMATION:  Send all requests for admission clinical reviews, approved or denied determinations and any other requests to dedicated fax number below belonging to the campus where the patient is receiving treatment   List of dedicated fax numbers:  [address] DENIALS (Administrative/Medical Necessity) [phone]   [address] (Maternity/NICU/Pediatrics) [phone]   [facility] [phone]   [address] [place] [address] [phone]   [address] [place] [address] [phone]   2000 [facility] [address] [place] [address],[address] [phone]   [facility]  [phone]   [address] [facility]  [address] [facility] 1000 [facility] [phone]

## 2024-10-06 NOTE — H&P
Acute Care Surgery  History and Physical  Birgit Loomis 63 y o  male MRN: [id number]  Unit/Bed#: ED-42 Encounter: [id number]    Assessment:  Birgit Loomis is a 63 y o  male with a PMHx laparoscopic cholecystectomy (1996) and prior SBO (2018, managed conservatively) who presents with 1 day h/o abdominal pain, nausea, and emesis with CT c/f recurrent SBO (dilated SB loops up to 4cm w/transition point RLQ)    VSS  Lactic WNL  WBC 11, Hgb 17  Abdomen soft, distended, not tympanitic, nontender     Plan:  Admit surgery service  NPO NGT IVF  Serial abdominal exams  DVT ppx SQH  Strict I/Os  Consider gastrograffin challenge later today if not progressing  Would consider outpatient endoscopy / colonoscopy once acute episode resolved      History of Present Illness     HPI:  Birgit Loomis is a 63 y o  male w/PMHx lap antonia and open right inguinal hernia repair (2000), who presents with 1 day h/o crampy abdominal pain, nausea, and emesis  Patient states he has been eating peanuts and popcorn for the past three days which he states he was told to avoid and historically binds him up  Earlier today he began having abdominal pain and nausea  He tried taking his Linzess without relief  He forced himself to throw up for relief approximately six times  He denies any additional symptoms  States that his last bowel movement was in the ED on arrival  Denies any blood in stool  He denies passing flatus currently  His abdomen is distended but not tympanitic or tender, although he was administered pain and nausea medication prior to my examination  Patient states he has never had a colonoscopy prior, but uses the at home cologuard test, of which was normal 2 years ago  ED workup revealing WBC 11, lactic WNL, CT scan c/f recurrent sbo w/dilated loops up to 4cm and transition point in RLQ and small free fluid in pelvis  Review of Systems   Constitutional: Negative  HENT: Negative  Eyes: Negative  Respiratory: Negative  Cardiovascular: Negative  Gastrointestinal: Positive for abdominal pain, nausea and vomiting  Endocrine: Negative  Genitourinary: Negative  Musculoskeletal: Negative  Skin: Negative  Allergic/Immunologic: Negative  Neurological: Negative  Hematological: Negative  Psychiatric/Behavioral: Negative  All other systems reviewed and are negative  Historical Information   Past Medical History:   Diagnosis Date    Arthritis     R knee    GERD (gastroesophageal reflux disease)      Past Surgical History:   Procedure Laterality Date    CHOLECYSTECTOMY  1996    MENISCECTOMY Right      Social History   Social History     Substance and Sexual Activity   Alcohol Use No     Social History     Substance and Sexual Activity   Drug Use No     Social History     Tobacco Use   Smoking Status Never Smoker   Smokeless Tobacco Never Used     Family History:   Family History   Problem Relation Age of Onset    Arthritis Mother     Osteoporosis Mother     Heart disease Father     No Known Problems Sister        Meds/Allergies   PTA meds:   Prior to Admission Medications   Prescriptions Last Dose Informant Patient Reported? Taking?    Testosterone (ANDROGEL TD)   Yes No   Sig: Place on the skin   Testosterone 30 MG/ACT SOLN   Yes No   Sig: Place on the skin   linaCLOtide (LINZESS) 72 MCG CAPS   Yes No   Sig: Linzess 72 mcg capsule   loratadine (CLARITIN) 10 mg tablet   Yes No   Sig: Take 10 mg by mouth daily   pantoprazole (PROTONIX) 20 mg tablet   Yes No   Sig: Take 20 mg by mouth daily   pantoprazole (PROTONIX) 40 mg tablet   Yes No   Sig: Take 40 mg by mouth daily      Facility-Administered Medications: None     Allergies   Allergen Reactions    Pollen Extract Sneezing     Itchy eyes, scratchy throat    Shellfish-Derived Products - Food Allergy Diarrhea and GI Intolerance       Objective   First Vitals:   Blood Pressure: 170/89 (09/01/22 2127)  Pulse: 104 (09/01/22 2127)  Temperature: 98 8 °F (37 1 °C) (09/01/22 2127)  Respirations: 18 (09/01/22 2127)  Weight - Scale: 77 1 kg (170 lb) (09/01/22 2127)  SpO2: 97 % (09/01/22 2127)    Current Vitals:   Blood Pressure: 153/86 (09/02/22 0030)  Pulse: 99 (09/02/22 0030)  Temperature: 98 8 °F (37 1 °C) (09/01/22 2127)  Respirations: 18 (09/02/22 0030)  Weight - Scale: 77 1 kg (170 lb) (09/01/22 2127)  SpO2: 96 % (09/02/22 0030)    No intake or output data in the 24 hours ending 09/02/22 0129    Invasive Devices  Report    Peripheral Intravenous Line  Duration           Peripheral IV 09/01/22 Left Antecubital <1 day                Physical Exam  Vitals reviewed  Constitutional:       General: He is not in acute distress  Appearance: Normal appearance  He is normal weight  HENT:      Head: Normocephalic and atraumatic  Right Ear: External ear normal       Left Ear: External ear normal       Nose: Nose normal       Mouth/Throat:      Mouth: Mucous membranes are moist       Pharynx: Oropharynx is clear  Eyes:      Extraocular Movements: Extraocular movements intact  Conjunctiva/sclera: Conjunctivae normal    Cardiovascular:      Rate and Rhythm: Normal rate  Pulmonary:      Effort: Pulmonary effort is normal  No respiratory distress  Abdominal:      General: There is distension  Palpations: Abdomen is soft  Tenderness: There is no abdominal tenderness  There is no guarding or rebound  Genitourinary:     Comments: deferred  Musculoskeletal:         General: Normal range of motion  Cervical back: Normal range of motion  Skin:     General: Skin is warm and dry  Neurological:      General: No focal deficit present  Mental Status: He is alert  Cranial Nerves: No cranial nerve deficit  Motor: No weakness  Psychiatric:         Mood and Affect: Mood normal          Behavior: Behavior normal          Thought Content:  Thought content normal          Lab Results:   CBC:   Lab Results   Component Value Date    WBC 10 89 (H) 09/01/2022    HGB 17 4 (H) 09/01/2022    HCT 51 1 (H) 09/01/2022    MCV 90 09/01/2022     09/01/2022    MCH 30 5 09/01/2022    MCHC 34 1 09/01/2022    RDW 13 5 09/01/2022    MPV 9 8 09/01/2022    NRBC 0 09/01/2022   , CMP:   Lab Results   Component Value Date    SODIUM 138 09/01/2022    K 4 1 09/01/2022     09/01/2022    CO2 25 09/01/2022    BUN 14 09/01/2022    CREATININE 0 78 09/01/2022    CALCIUM 10 2 09/01/2022    AST 19 09/01/2022    ALT 20 09/01/2022    ALKPHOS 76 09/01/2022    EGFR 98 09/01/2022   , Coagulation: No results found for: PT, INR, APTT, Urinalysis: No results found for: Tedra Nieves, SPECGRAV, PHUR, LEUKOCYTESUR, NITRITE, PROTEINUA, GLUCOSEU, KETONESU, BILIRUBINUR, BLOODU, Amylase: No results found for: AMYLASE, Lipase:   Lab Results   Component Value Date    LIPASE 18 09/01/2022     Imaging: I have personally reviewed pertinent reports  EKG, Pathology, and Other Studies: I have personally reviewed pertinent reports  Code Status: Prior  Advance Directive and Living Will:      Power of :    POLST:      Counseling / Coordination of Care  Total floor / unit time spent today 45 minutes  This involved direct patient contact where I performed a full history and physical, reviewed previous records, and reviewed laboratory and other diagnostic studies  Greater than 50% of total time was spent with the patient and / or family counseling and / or coordination of care      Trevor Vazquez MD  9/2/2022 No